# Patient Record
Sex: FEMALE | Race: WHITE | NOT HISPANIC OR LATINO | Employment: FULL TIME | URBAN - METROPOLITAN AREA
[De-identification: names, ages, dates, MRNs, and addresses within clinical notes are randomized per-mention and may not be internally consistent; named-entity substitution may affect disease eponyms.]

---

## 2020-11-11 ENCOUNTER — TELEPHONE (OUTPATIENT)
Dept: UROLOGY | Facility: CLINIC | Age: 50
End: 2020-11-11

## 2020-11-11 DIAGNOSIS — R35.0 URINARY FREQUENCY: Primary | ICD-10-CM

## 2020-11-11 NOTE — TELEPHONE ENCOUNTER
----- Message from Nishi Vaughn sent at 11/11/2020 11:00 AM CST -----  Please call pt @ [phone] regarding an order urine specimen, pt states she have bladder infection, pt states she been working on getting insurance approve

## 2020-11-11 NOTE — TELEPHONE ENCOUNTER
Pt call c/o frequency, odor. Thinks she may have UTI. Former pt from [place] but she has [employer] which we do not accept here. She stated it may take her a while to get insurance figured out but in the meantime would like to be treated for UTI. Please advise.

## 2021-05-07 ENCOUNTER — TELEPHONE (OUTPATIENT)
Dept: UROLOGY | Facility: CLINIC | Age: 51
End: 2021-05-07

## 2021-05-28 ENCOUNTER — TELEPHONE (OUTPATIENT)
Dept: UROLOGY | Facility: CLINIC | Age: 51
End: 2021-05-28

## 2021-07-23 ENCOUNTER — OFFICE VISIT (OUTPATIENT)
Dept: UROLOGY | Facility: CLINIC | Age: 51
End: 2021-07-23
Payer: MEDICARE

## 2021-07-23 VITALS — WEIGHT: 170 LBS

## 2021-07-23 DIAGNOSIS — N39.0 FREQUENT UTI: ICD-10-CM

## 2021-07-23 DIAGNOSIS — N20.0 KIDNEY STONES: Primary | ICD-10-CM

## 2021-07-23 PROCEDURE — 99212 OFFICE O/P EST SF 10 MIN: CPT | Mod: PBBFAC,PO | Performed by: UROLOGY

## 2021-07-23 PROCEDURE — 87186 SC STD MICRODIL/AGAR DIL: CPT | Performed by: UROLOGY

## 2021-07-23 PROCEDURE — 99213 OFFICE O/P EST LOW 20 MIN: CPT | Mod: S$PBB,,, | Performed by: UROLOGY

## 2021-07-23 PROCEDURE — 87086 URINE CULTURE/COLONY COUNT: CPT | Performed by: UROLOGY

## 2021-07-23 PROCEDURE — 87088 URINE BACTERIA CULTURE: CPT | Performed by: UROLOGY

## 2021-07-23 PROCEDURE — 99999 PR PBB SHADOW E&M-EST. PATIENT-LVL II: ICD-10-PCS | Mod: PBBFAC,,, | Performed by: UROLOGY

## 2021-07-23 PROCEDURE — 87077 CULTURE AEROBIC IDENTIFY: CPT | Performed by: UROLOGY

## 2021-07-23 PROCEDURE — 99999 PR PBB SHADOW E&M-EST. PATIENT-LVL II: CPT | Mod: PBBFAC,,, | Performed by: UROLOGY

## 2021-07-23 PROCEDURE — 99213 PR OFFICE/OUTPT VISIT, EST, LEVL III, 20-29 MIN: ICD-10-PCS | Mod: S$PBB,,, | Performed by: UROLOGY

## 2021-07-23 RX ORDER — PAROXETINE HYDROCHLORIDE 20 MG/1
TABLET, FILM COATED ORAL
COMMUNITY
Start: 2021-05-18 | End: 2021-12-22

## 2021-07-23 RX ORDER — PROPRANOLOL HYDROCHLORIDE 160 MG/1
1 CAPSULE, EXTENDED RELEASE ORAL NIGHTLY
COMMUNITY
Start: 2021-05-03

## 2021-07-23 RX ORDER — AMITRIPTYLINE HYDROCHLORIDE 100 MG/1
1 TABLET ORAL NIGHTLY
COMMUNITY
Start: 2021-06-21

## 2021-07-26 ENCOUNTER — HOSPITAL ENCOUNTER (OUTPATIENT)
Dept: RADIOLOGY | Facility: HOSPITAL | Age: 51
Discharge: HOME | End: 2021-07-26
Attending: UROLOGY
Payer: MEDICARE

## 2021-07-26 DIAGNOSIS — N20.0 KIDNEY STONES: ICD-10-CM

## 2021-07-26 PROCEDURE — 74018 RADEX ABDOMEN 1 VIEW: CPT | Mod: 26,,, | Performed by: RADIOLOGY

## 2021-07-26 PROCEDURE — 74018 XR ABDOMEN AP 1 VIEW: ICD-10-PCS | Mod: 26,,, | Performed by: RADIOLOGY

## 2021-07-26 PROCEDURE — 74018 RADEX ABDOMEN 1 VIEW: CPT | Mod: TC

## 2021-07-27 ENCOUNTER — TELEPHONE (OUTPATIENT)
Dept: UROLOGY | Facility: CLINIC | Age: 51
End: 2021-07-27

## 2021-07-27 LAB — BACTERIA UR CULT: ABNORMAL

## 2021-07-27 RX ORDER — NITROFURANTOIN 25; 75 MG/1; MG/1
100 CAPSULE ORAL 2 TIMES DAILY
Qty: 14 CAPSULE | Refills: 0 | Status: SHIPPED | OUTPATIENT
Start: 2021-07-27 | End: 2021-12-22

## 2021-09-09 ENCOUNTER — LAB VISIT (OUTPATIENT)
Dept: LAB | Facility: HOSPITAL | Age: 51
End: 2021-09-09
Attending: UROLOGY
Payer: MEDICARE

## 2021-09-09 ENCOUNTER — TELEPHONE (OUTPATIENT)
Dept: UROLOGY | Facility: CLINIC | Age: 51
End: 2021-09-09

## 2021-09-09 DIAGNOSIS — N39.0 FREQUENT UTI: ICD-10-CM

## 2021-09-09 DIAGNOSIS — N39.0 FREQUENT UTI: Primary | ICD-10-CM

## 2021-09-09 LAB
BILIRUB UR QL STRIP: NEGATIVE
CLARITY UR REFRACT.AUTO: ABNORMAL
COLOR UR AUTO: YELLOW
GLUCOSE UR QL STRIP: NEGATIVE
HGB UR QL STRIP: NEGATIVE
KETONES UR QL STRIP: NEGATIVE
LEUKOCYTE ESTERASE UR QL STRIP: NEGATIVE
NITRITE UR QL STRIP: NEGATIVE
PH UR STRIP: 5 [PH] (ref 5–8)
PROT UR QL STRIP: NEGATIVE
SP GR UR STRIP: 1.01 (ref 1–1.03)
URN SPEC COLLECT METH UR: ABNORMAL

## 2021-09-09 PROCEDURE — 87086 URINE CULTURE/COLONY COUNT: CPT | Performed by: UROLOGY

## 2021-09-09 PROCEDURE — 87088 URINE BACTERIA CULTURE: CPT | Performed by: UROLOGY

## 2021-09-09 PROCEDURE — 87077 CULTURE AEROBIC IDENTIFY: CPT | Performed by: UROLOGY

## 2021-09-09 PROCEDURE — 81003 URINALYSIS AUTO W/O SCOPE: CPT | Performed by: UROLOGY

## 2021-09-09 PROCEDURE — 87186 SC STD MICRODIL/AGAR DIL: CPT | Performed by: UROLOGY

## 2021-09-10 ENCOUNTER — TELEPHONE (OUTPATIENT)
Dept: UROLOGY | Facility: CLINIC | Age: 51
End: 2021-09-10

## 2021-09-11 RX ORDER — AMOXICILLIN 500 MG/1
500 CAPSULE ORAL EVERY 8 HOURS
Qty: 30 CAPSULE | Refills: 0 | Status: SHIPPED | OUTPATIENT
Start: 2021-09-11 | End: 2021-12-22

## 2021-09-12 LAB — BACTERIA UR CULT: ABNORMAL

## 2021-09-13 ENCOUNTER — TELEPHONE (OUTPATIENT)
Dept: UROLOGY | Facility: CLINIC | Age: 51
End: 2021-09-13

## 2021-10-14 ENCOUNTER — TELEPHONE (OUTPATIENT)
Dept: UROLOGY | Facility: CLINIC | Age: 51
End: 2021-10-14

## 2022-10-07 LAB — RESULT: NORMAL

## 2023-02-16 ENCOUNTER — TELEPHONE (OUTPATIENT)
Dept: UROLOGY | Facility: CLINIC | Age: 53
End: 2023-02-16
Payer: MEDICARE

## 2023-02-16 NOTE — TELEPHONE ENCOUNTER
----- Message from Meena Guzman sent at 2/16/2023  3:27 PM CST -----  Contact: glenna  .Type:  Patient Returning Call    Who Called:glenna  Who Left Message for Patient:nurse  Does the patient know what this is regarding?:unknown  Would the patient rather a call back or a response via [facility]? Call back   Best Call Back Number:.[phone]   Additional Information: patient returning call

## 2023-02-16 NOTE — TELEPHONE ENCOUNTER
----- Message from Amanda Steele sent at 2/16/2023  8:24 AM CST -----  Contact: [phone]  Pt is calling stating she have a bladder infection that never cleared . Please call back at [phone] . Thanks/jitendra

## 2023-02-20 ENCOUNTER — TELEPHONE (OUTPATIENT)
Dept: UROLOGY | Facility: CLINIC | Age: 53
End: 2023-02-20
Payer: MEDICARE

## 2023-02-20 NOTE — TELEPHONE ENCOUNTER
Informed patient that Dr. Pepper won't be in for another week. She had an appointment with Kay Leung on 02/20 but she missed the appointment. Patient states that she will call and schedule appointment at later date.

## 2023-02-20 NOTE — TELEPHONE ENCOUNTER
----- Message from Pao Smallslucialine sent at 2/20/2023  8:18 AM CST -----  Contact: pt  Pt is calling in regard to have the nurse to discuss what days the doctor will be available.  Please call her back at [phone] fern/mpd

## 2023-05-01 ENCOUNTER — OFFICE VISIT (OUTPATIENT)
Dept: UROLOGY | Facility: CLINIC | Age: 53
End: 2023-05-01
Payer: MEDICARE

## 2023-05-01 VITALS
BODY MASS INDEX: 26.73 KG/M2 | DIASTOLIC BLOOD PRESSURE: 82 MMHG | SYSTOLIC BLOOD PRESSURE: 130 MMHG | WEIGHT: 170.63 LBS | HEART RATE: 80 BPM

## 2023-05-01 DIAGNOSIS — R31.29 MICROHEMATURIA: ICD-10-CM

## 2023-05-01 DIAGNOSIS — N22 CALCULUS OF URINARY TRACT IN DISEASES CLASSIFIED ELSEWHERE: ICD-10-CM

## 2023-05-01 DIAGNOSIS — N39.0 FREQUENT UTI: Primary | ICD-10-CM

## 2023-05-01 PROCEDURE — 99213 OFFICE O/P EST LOW 20 MIN: CPT | Mod: PBBFAC | Performed by: UROLOGY

## 2023-05-01 PROCEDURE — 99999 PR PBB SHADOW E&M-EST. PATIENT-LVL III: CPT | Mod: PBBFAC,,, | Performed by: UROLOGY

## 2023-05-01 PROCEDURE — 87086 URINE CULTURE/COLONY COUNT: CPT | Performed by: UROLOGY

## 2023-05-01 PROCEDURE — 99999 PR PBB SHADOW E&M-EST. PATIENT-LVL III: ICD-10-PCS | Mod: PBBFAC,,, | Performed by: UROLOGY

## 2023-05-01 PROCEDURE — 99214 PR OFFICE/OUTPT VISIT, EST, LEVL IV, 30-39 MIN: ICD-10-PCS | Mod: S$PBB,,, | Performed by: UROLOGY

## 2023-05-01 PROCEDURE — 99214 OFFICE O/P EST MOD 30 MIN: CPT | Mod: S$PBB,,, | Performed by: UROLOGY

## 2023-05-01 RX ORDER — PHENAZOPYRIDINE HYDROCHLORIDE 200 MG/1
200 TABLET, FILM COATED ORAL 3 TIMES DAILY PRN
Qty: 15 TABLET | Refills: 0 | Status: SHIPPED | OUTPATIENT
Start: 2023-05-01 | End: 2023-05-11

## 2023-05-01 RX ORDER — NITROFURANTOIN 25; 75 MG/1; MG/1
100 CAPSULE ORAL 2 TIMES DAILY
Qty: 14 CAPSULE | Refills: 0 | Status: ON HOLD | OUTPATIENT
Start: 2023-05-01 | End: 2023-06-05 | Stop reason: HOSPADM

## 2023-05-01 RX ORDER — MELOXICAM 15 MG/1
15 TABLET ORAL DAILY
COMMUNITY

## 2023-05-01 NOTE — PROGRESS NOTES
CC: Check up bladder and kidney stones    History of Present Illness:   Amanda Vega is a 52 y.o. female here for follow up    5/1/23-She has been treated for 3 UTIs over the past 2 months. Main symptoms were pain in her body and she just thought it was her fibromyalgia. No gross hematuria. No dysuria. She does have somewhat of a sharp pain in her vagina. She was treated with amoxicillin 3 times. No urinary frequency, but she does have some vaginal itching and foul odor. Slight sharp flank pain on the right, but it went away. No recent stone passage.   7/23/21-She states that she had a UTI recently. Was placed on Macrobid. No dysuria currently. No recent kidney stone passage. Drinking more water. No gross hematuria. +frequency, but not bothersome.     Father recently passed from covid.       Review of Systems   Constitutional:  Negative for chills and fever.   Respiratory:  Negative for shortness of breath.    Cardiovascular:  Negative for chest pain.   Gastrointestinal:  Negative for abdominal pain.   Genitourinary:  Negative for hematuria.   Musculoskeletal:  Positive for arthralgias.   Psychiatric/Behavioral:  Negative for confusion.    All other systems reviewed and are negative.    Current Outpatient Medications   Medication Sig Dispense Refill    amitriptyline (ELAVIL) 100 MG tablet Take 1 tablet by mouth every evening.      meloxicam (MOBIC) 15 MG tablet Take 15 mg by mouth once daily.      propranoloL (INDERAL LA) 160 mg 24 hr capsule Take 1 capsule by mouth every evening.      vortioxetine (TRINTELLIX) 10 mg Tab Take 10 mg by mouth.      nitrofurantoin, macrocrystal-monohydrate, (MACROBID) 100 MG capsule Take 1 capsule (100 mg total) by mouth 2 (two) times daily. 14 capsule 0    phenazopyridine (PYRIDIUM) 200 MG tablet Take 1 tablet (200 mg total) by mouth 3 (three) times daily as needed for Pain. 15 tablet 0    valACYclovir (VALTREX) 500 MG tablet Take 1 tablet (500 mg total) by mouth 2 (two) times  daily. for 3 days 6 tablet 2     No current facility-administered medications for this visit.       Review of patient's allergies indicates:  No Known Allergies    Past medical, family, and social history reviewed as documented in chart with pertinent positive medical, family, and social history detailed in HPI.    Physical Exam  Vitals:    05/01/23 1011   BP: 130/82   Pulse: 80       General: Well-developed, Well Nourished in No acute distress  HEENT: Normocephalic, Atraumatic, Extraocular Movements intact  Neck: Supple, trachea midline, no cervical or supraclavicular lymphadenopathy  Respirations: Even and unlabored  Back: Midline spine without tenderness, no CVA tenderness  Abdomen: Soft, Non-tender, non-distended, no hepatosplenomegaly, no rebound or guarding, no palpable masses  Extremities: Moves all equally, atraumatic, no clubbing, cyanosis or edema  Skin: warm and dry  Psych: normal affect  Neuro: Alert and oriented x 3, Cranial nerves II-XII grossly intact      Urinalysis  Moderate blood, small LE, 100 protein    Assesment:   1. Frequent UTI  CULTURE, URINE      2. Calculus of urinary tract in diseases classified elsewhere  CT Renal Stone Study ABD Pelvis WO      3. Microhematuria                Plan:  Frequent UTI  -     CULTURE, URINE    Calculus of urinary tract in diseases classified elsewhere  -     CT Renal Stone Study ABD Pelvis WO; Future; Expected date: 05/01/2023    Microhematuria    Other orders  -     nitrofurantoin, macrocrystal-monohydrate, (MACROBID) 100 MG capsule; Take 1 capsule (100 mg total) by mouth 2 (two) times daily.  Dispense: 14 capsule; Refill: 0  -     phenazopyridine (PYRIDIUM) 200 MG tablet; Take 1 tablet (200 mg total) by mouth 3 (three) times daily as needed for Pain.  Dispense: 15 tablet; Refill: 0

## 2023-05-02 LAB
BACTERIA UR CULT: NORMAL
BACTERIA UR CULT: NORMAL

## 2023-05-05 ENCOUNTER — TELEPHONE (OUTPATIENT)
Dept: UROLOGY | Facility: CLINIC | Age: 53
End: 2023-05-05
Payer: MEDICARE

## 2023-05-05 NOTE — TELEPHONE ENCOUNTER
Called and spoke with patient, informed her that her culture results are not back in yet everything is resolved.  ----- Message from Evie Wolff sent at 5/4/2023 11:26 AM CDT -----  Contact: self [phone]  Patient called in this morning stating she had left a urine sample on Monday and no one has called her back with the result. Please call back [phone]

## 2023-05-15 ENCOUNTER — HOSPITAL ENCOUNTER (OUTPATIENT)
Dept: RADIOLOGY | Facility: HOSPITAL | Age: 53
Discharge: HOME | End: 2023-05-15
Attending: UROLOGY
Payer: MEDICARE

## 2023-05-15 DIAGNOSIS — N22 CALCULUS OF URINARY TRACT IN DISEASES CLASSIFIED ELSEWHERE: ICD-10-CM

## 2023-05-15 PROCEDURE — 74176 CT RENAL STONE STUDY ABD PELVIS WO: ICD-10-PCS | Mod: 26,,, | Performed by: RADIOLOGY

## 2023-05-15 PROCEDURE — 74176 CT ABD & PELVIS W/O CONTRAST: CPT | Mod: 26,,, | Performed by: RADIOLOGY

## 2023-05-15 PROCEDURE — 74176 CT ABD & PELVIS W/O CONTRAST: CPT | Mod: TC

## 2023-05-16 ENCOUNTER — TELEPHONE (OUTPATIENT)
Dept: UROLOGY | Facility: CLINIC | Age: 53
End: 2023-05-16
Payer: MEDICARE

## 2023-05-16 NOTE — TELEPHONE ENCOUNTER
Called pt concerning labs. No answer [facility] 1:42    ----- Message from Marian Pepper MD sent at 5/16/2023  1:19 PM CDT -----  Please notify patient that she has a large kidney stone that is in a location to soon be obstructing. Please schedule her for a follow up with me within the next week or 2.

## 2023-05-26 ENCOUNTER — TELEPHONE (OUTPATIENT)
Dept: UROLOGY | Facility: CLINIC | Age: 53
End: 2023-05-26
Payer: MEDICARE

## 2023-05-26 NOTE — TELEPHONE ENCOUNTER
Spoke with patient and will get her scheduled for a 2 week f/u with dr tatum.  ----- Message from Renetta Álvarez sent at 5/26/2023  1:39 PM CDT -----  Contact: Amanda Crossci needs a call back at [phone], Regards to her right side pain she thinks it may related to a kidney stones.    Thanks  Td

## 2023-05-29 ENCOUNTER — HOSPITAL ENCOUNTER (OUTPATIENT)
Dept: CARDIOLOGY | Facility: HOSPITAL | Age: 53
Discharge: HOME | End: 2023-05-29
Attending: UROLOGY
Payer: MEDICARE

## 2023-05-29 ENCOUNTER — OFFICE VISIT (OUTPATIENT)
Dept: UROLOGY | Facility: CLINIC | Age: 53
End: 2023-05-29
Payer: MEDICARE

## 2023-05-29 VITALS
WEIGHT: 168.63 LBS | SYSTOLIC BLOOD PRESSURE: 113 MMHG | HEIGHT: 67 IN | BODY MASS INDEX: 26.47 KG/M2 | HEART RATE: 80 BPM | DIASTOLIC BLOOD PRESSURE: 75 MMHG

## 2023-05-29 DIAGNOSIS — Z01.818 PRE-OP TESTING: ICD-10-CM

## 2023-05-29 DIAGNOSIS — N20.0 LEFT RENAL STONE: Primary | ICD-10-CM

## 2023-05-29 DIAGNOSIS — N39.0 RECURRENT UTI: ICD-10-CM

## 2023-05-29 PROCEDURE — 99214 OFFICE O/P EST MOD 30 MIN: CPT | Mod: S$PBB,,, | Performed by: UROLOGY

## 2023-05-29 PROCEDURE — 99214 OFFICE O/P EST MOD 30 MIN: CPT | Mod: PBBFAC | Performed by: UROLOGY

## 2023-05-29 PROCEDURE — 93010 EKG 12-LEAD: ICD-10-PCS | Mod: ,,, | Performed by: INTERNAL MEDICINE

## 2023-05-29 PROCEDURE — 99214 PR OFFICE/OUTPT VISIT, EST, LEVL IV, 30-39 MIN: ICD-10-PCS | Mod: S$PBB,,, | Performed by: UROLOGY

## 2023-05-29 PROCEDURE — 87077 CULTURE AEROBIC IDENTIFY: CPT | Performed by: UROLOGY

## 2023-05-29 PROCEDURE — 87186 SC STD MICRODIL/AGAR DIL: CPT | Performed by: UROLOGY

## 2023-05-29 PROCEDURE — 87086 URINE CULTURE/COLONY COUNT: CPT | Performed by: UROLOGY

## 2023-05-29 PROCEDURE — 93010 ELECTROCARDIOGRAM REPORT: CPT | Mod: ,,, | Performed by: INTERNAL MEDICINE

## 2023-05-29 PROCEDURE — 87088 URINE BACTERIA CULTURE: CPT | Performed by: UROLOGY

## 2023-05-29 PROCEDURE — 99999 PR PBB SHADOW E&M-EST. PATIENT-LVL IV: ICD-10-PCS | Mod: PBBFAC,,, | Performed by: UROLOGY

## 2023-05-29 PROCEDURE — 99999 PR PBB SHADOW E&M-EST. PATIENT-LVL IV: CPT | Mod: PBBFAC,,, | Performed by: UROLOGY

## 2023-05-29 PROCEDURE — 93005 ELECTROCARDIOGRAM TRACING: CPT

## 2023-05-29 RX ORDER — CIPROFLOXACIN 2 MG/ML
400 INJECTION, SOLUTION INTRAVENOUS
Status: CANCELLED | OUTPATIENT
Start: 2023-05-29

## 2023-05-29 RX ORDER — HYDROCODONE BITARTRATE AND ACETAMINOPHEN 10; 325 MG/1; MG/1
1 TABLET ORAL EVERY 6 HOURS PRN
Qty: 20 TABLET | Refills: 0 | Status: ON HOLD | OUTPATIENT
Start: 2023-05-29 | End: 2023-06-05 | Stop reason: SDUPTHER

## 2023-05-29 NOTE — PROGRESS NOTES
CC: Check up bladder and kidney stones    History of Present Illness:   Amanda Vega is a 52 y.o. female here for follow up    5/29/23- Pt reports that she has been having some LLQ pain and sharp pain in her bottom. She also reports some right flank pain. No fever. CT scan was personally reviewed, showing a 10mm L renal pelvis stone and bilateral non-obstructing renal stones. HU measure <1000.   5/1/23-She has been treated for 3 UTIs over the past 2 months. Main symptoms were pain in her body and she just thought it was her fibromyalgia. No gross hematuria. No dysuria. She does have somewhat of a sharp pain in her vagina. She was treated with amoxicillin 3 times. No urinary frequency, but she does have some vaginal itching and foul odor. Slight sharp flank pain on the right, but it went away. No recent stone passage.   7/23/21-She states that she had a UTI recently. Was placed on Macrobid. No dysuria currently. No recent kidney stone passage. Drinking more water. No gross hematuria. +frequency, but not bothersome.   Father recently passed from covid.       Review of Systems   Constitutional:  Negative for chills and fever.   Respiratory:  Negative for shortness of breath.    Cardiovascular:  Negative for chest pain.   Gastrointestinal:  Negative for abdominal pain.   Genitourinary:  Negative for hematuria.   Musculoskeletal:  Positive for arthralgias.   Psychiatric/Behavioral:  Negative for confusion.    All other systems reviewed and are negative.    Current Outpatient Medications   Medication Sig Dispense Refill    amitriptyline (ELAVIL) 100 MG tablet Take 1 tablet by mouth every evening.      meloxicam (MOBIC) 15 MG tablet Take 15 mg by mouth once daily.      nitrofurantoin, macrocrystal-monohydrate, (MACROBID) 100 MG capsule Take 1 capsule (100 mg total) by mouth 2 (two) times daily. 14 capsule 0    propranoloL (INDERAL LA) 160 mg 24 hr capsule Take 1 capsule by mouth every evening.      vortioxetine  (TRINTELLIX) 10 mg Tab Take 10 mg by mouth.      HYDROcodone-acetaminophen (NORCO)  mg per tablet Take 1 tablet by mouth every 6 (six) hours as needed for Pain. 20 tablet 0    valACYclovir (VALTREX) 500 MG tablet Take 1 tablet (500 mg total) by mouth 2 (two) times daily. for 3 days 6 tablet 2     No current facility-administered medications for this visit.       Review of patient's allergies indicates:  No Known Allergies    Past medical, family, and social history reviewed as documented in chart with pertinent positive medical, family, and social history detailed in HPI.    Physical Exam  Vitals:    05/29/23 0849   BP: 113/75   Pulse: 80       General: Well-developed, Well Nourished in No acute distress  HEENT: Normocephalic, Atraumatic, Extraocular Movements intact  Neck: Supple, trachea midline, no cervical or supraclavicular lymphadenopathy  Respirations: Even and unlabored  Back: Midline spine without tenderness, no CVA tenderness  Abdomen: Soft, LLQ tenderness, non-distended, no hepatosplenomegaly, no rebound or guarding, no palpable masses  Extremities: Moves all equally, atraumatic, no clubbing, cyanosis or edema  Skin: warm and dry  Psych: normal affect  Neuro: Alert and oriented x 3, Cranial nerves II-XII grossly intact      Assesment:   1. Left renal stone  Place in Outpatient    Case Request Operating Room: LITHOTRIPSY, ESWL    Vital Signs     Diet NPO    Place sequential compression device    HYDROcodone-acetaminophen (NORCO)  mg per tablet      2. Recurrent UTI  Urine culture              Plan:  Left renal stone  -     Place in Outpatient; Standing  -     Case Request Operating Room: LITHOTRIPSY, ESWL  -     Vital Signs ; Standing  -     Diet NPO; Standing  -     Place sequential compression device; Standing  -     HYDROcodone-acetaminophen (NORCO)  mg per tablet; Take 1 tablet by mouth every 6 (six) hours as needed for Pain.  Dispense: 20 tablet; Refill: 0    Recurrent UTI  -      Urine culture    Other orders  -     IP VTE LOW RISK PATIENT; Standing  -     ciprofloxacin (CIPRO)400mg/200ml D5W IVPB 400 mg      Discussed ESWL vs USE with stent. Pt would like to avoid stenting and understands the possibility of large fragments following ESWL. Will schedule ESWL for 6/5/23.

## 2023-05-30 RX ORDER — AMOXICILLIN AND CLAVULANATE POTASSIUM 875; 125 MG/1; MG/1
1 TABLET, FILM COATED ORAL EVERY 12 HOURS
Qty: 20 TABLET | Refills: 0 | Status: SHIPPED | OUTPATIENT
Start: 2023-05-30 | End: 2023-08-10

## 2023-05-31 ENCOUNTER — TELEPHONE (OUTPATIENT)
Dept: PREADMISSION TESTING | Facility: HOSPITAL | Age: 53
End: 2023-05-31
Payer: MEDICARE

## 2023-05-31 LAB — BACTERIA UR CULT: ABNORMAL

## 2023-05-31 NOTE — TELEPHONE ENCOUNTER
Pre op instructions reviewed with Pt,verbalized understanding.    To confirm, Surgery is scheduled on 6/05/23. We will call you late afternoon the business day prior to surgery with your arrival time.    *Please report to the [facility]obby (1st Floor) located off of [facility] (2nd Entrance/Building on the left, in front of the flag pole).  Address: [address] [place] [address] [place] [address]        INSTRUCTIONS IMPORTANT!!!  Do Not Eat, Drink, or Smoke after 12 midnight unless instructed otherwise by your Surgeon. OK to brush teeth, no gum, candy or mints!      *Take Only these medications with a small sip of water Morning of Surgery:  None      ____  HOLD all vitamins, herbal supplements, aspirin products & NSAIDS 7 days prior to surgery, as these can thin the blood.  ____  Avoid Alcoholic beverages 3 days prior to surgery, as it can thin the blood.  ____  NO Acrylic/fake nails or nail polish worn day of surgery (specifically hand/arm & foot surgeries).  ____  NO powder, lotions, deodorants, oils or cream on body.  ____  Remove all jewelry & piercings prior to surgery.  ____  Remove Dentures, Hearing Aids & Contact Lens prior to surgery.  ____  Bring photo ID and insurance information to hospital (Leave Valuables at Home).  ____  If going home the same day, arrange for a ride home. You will not be able to drive for 24 hrs if Anesthesia was used.   ____  Females (ages 11-60): may need to give a urine sample the morning of surgery; please see Pre op Nurse prior to using the restroom.  ____  Males: Stop ED medications (Viagra, Cialis) 24 hrs prior to surgery.  ____  Wear clean, loose fitting clothing to allow for dressings/ bandages.            Diabetic Patients: If you take diabetic medication, do NOT take morning of surgery unless instructed by Doctor. Metformin to be stopped 24 hrs prior to surgery time. DO NOT take long-acting insulin the evening before surgery. Blood sugars will be checked in  pre-op by Nurse.    Bathing Instructions:    -Shower with anti-bacterial Soap (Hibiclens or Dial) the night before surgery and the morning of.   -Do not use Hibiclens on your face or genitals.   -Apply clean clothes after shower.  -Do not shave your face or body 2 days prior to surgery unless instructed otherwise by your Surgeon.  -Do not shave pubic hair 7 days prior to surgery (gyn pt's).    [facility] Visitor/Ride Policy:  Only 2 adults allowed (over the age of 18) to accompany you to the Hospital. You Must have a ride home from a responsible adult that you know and trust. Medical Transport, Uber or Lyft can only be used if patient has a responsible adult to accompany them during ride home.    Discharge Instructions: You will receive Post-op/Discharge instructions by your Discharge Nurse prior to going home. Please call your Surgeon's office with any post-surgery questions/concerns @ [phone].    *If you are running late or have questions the morning of surgery, please call the Surgery Dept @ [phone]  *Insurance/ Financial Questions, please call [phone]  *If you need to Cancel/Reschedule Surgery, please call Surgeon office @ [phone].      Thank you,  -[facility] Pre Admit Dept.  [phone] or [phone]  M-F 7:30 am-4:30 pm (Closed Major Holidays)

## 2023-06-02 ENCOUNTER — TELEPHONE (OUTPATIENT)
Dept: UROLOGY | Facility: CLINIC | Age: 53
End: 2023-06-02
Payer: MEDICARE

## 2023-06-02 ENCOUNTER — TELEPHONE (OUTPATIENT)
Dept: PREADMISSION TESTING | Facility: HOSPITAL | Age: 53
End: 2023-06-02
Payer: MEDICARE

## 2023-06-02 NOTE — TELEPHONE ENCOUNTER
----- Message from Rosie Garcia RN sent at 6/2/2023  2:35 PM CDT -----  Regarding: Surgery Arrival Time  Hello, I was not able to reach the patient or her mother/transportation directly with surgery arrival time of 12:00 noon. I left each a voice mail and sent a My Chart Message. Thanks

## 2023-06-05 ENCOUNTER — ANESTHESIA EVENT (OUTPATIENT)
Dept: SURGERY | Facility: HOSPITAL | Age: 53
End: 2023-06-05
Payer: MEDICARE

## 2023-06-05 ENCOUNTER — ANESTHESIA (OUTPATIENT)
Dept: SURGERY | Facility: HOSPITAL | Age: 53
End: 2023-06-05
Payer: MEDICARE

## 2023-06-05 ENCOUNTER — HOSPITAL ENCOUNTER (OUTPATIENT)
Facility: HOSPITAL | Age: 53
Discharge: HOME | End: 2023-06-05
Attending: UROLOGY | Admitting: UROLOGY
Payer: MEDICARE

## 2023-06-05 DIAGNOSIS — N20.0 LEFT RENAL STONE: Primary | ICD-10-CM

## 2023-06-05 PROCEDURE — 37000009 HC ANESTHESIA EA ADD 15 MINS: Performed by: UROLOGY

## 2023-06-05 PROCEDURE — 63600175 PHARM REV CODE 636 W HCPCS: Performed by: ANESTHESIOLOGY

## 2023-06-05 PROCEDURE — 37000008 HC ANESTHESIA 1ST 15 MINUTES: Performed by: UROLOGY

## 2023-06-05 PROCEDURE — 36000705 HC OR TIME LEV I EA ADD 15 MIN: Performed by: UROLOGY

## 2023-06-05 PROCEDURE — 71000015 HC POSTOP RECOV 1ST HR: Performed by: UROLOGY

## 2023-06-05 PROCEDURE — 25000003 PHARM REV CODE 250: Performed by: NURSE ANESTHETIST, CERTIFIED REGISTERED

## 2023-06-05 PROCEDURE — 63600175 PHARM REV CODE 636 W HCPCS: Performed by: UROLOGY

## 2023-06-05 PROCEDURE — 71000033 HC RECOVERY, INTIAL HOUR: Performed by: UROLOGY

## 2023-06-05 PROCEDURE — 63600175 PHARM REV CODE 636 W HCPCS: Performed by: NURSE ANESTHETIST, CERTIFIED REGISTERED

## 2023-06-05 PROCEDURE — 50590 FRAGMENTING OF KIDNEY STONE: CPT | Mod: LT,,, | Performed by: UROLOGY

## 2023-06-05 PROCEDURE — 36000704 HC OR TIME LEV I 1ST 15 MIN: Performed by: UROLOGY

## 2023-06-05 PROCEDURE — 50590 PR FRAGMENT KIDNEY STONE/ ESWL: ICD-10-PCS | Mod: LT,,, | Performed by: UROLOGY

## 2023-06-05 RX ORDER — CIPROFLOXACIN 2 MG/ML
400 INJECTION, SOLUTION INTRAVENOUS
Status: COMPLETED | OUTPATIENT
Start: 2023-06-05 | End: 2023-06-05

## 2023-06-05 RX ORDER — TAMSULOSIN HYDROCHLORIDE 0.4 MG/1
0.4 CAPSULE ORAL DAILY
Qty: 30 CAPSULE | Refills: 0 | Status: SHIPPED | OUTPATIENT
Start: 2023-06-05 | End: 2023-08-10

## 2023-06-05 RX ORDER — ONDANSETRON 2 MG/ML
INJECTION INTRAMUSCULAR; INTRAVENOUS
Status: DISCONTINUED | OUTPATIENT
Start: 2023-06-05 | End: 2023-06-05

## 2023-06-05 RX ORDER — SODIUM CHLORIDE 0.9 % (FLUSH) 0.9 %
3 SYRINGE (ML) INJECTION
Status: DISCONTINUED | OUTPATIENT
Start: 2023-06-05 | End: 2023-06-05 | Stop reason: HOSPADM

## 2023-06-05 RX ORDER — HYDROMORPHONE HYDROCHLORIDE 2 MG/ML
0.2 INJECTION, SOLUTION INTRAMUSCULAR; INTRAVENOUS; SUBCUTANEOUS EVERY 5 MIN PRN
Status: DISCONTINUED | OUTPATIENT
Start: 2023-06-05 | End: 2023-06-05 | Stop reason: HOSPADM

## 2023-06-05 RX ORDER — ONDANSETRON 4 MG/1
4 TABLET, ORALLY DISINTEGRATING ORAL EVERY 8 HOURS PRN
Qty: 20 TABLET | Refills: 0 | Status: SHIPPED | OUTPATIENT
Start: 2023-06-05

## 2023-06-05 RX ORDER — OXYCODONE HYDROCHLORIDE 5 MG/1
5 TABLET ORAL
Status: DISCONTINUED | OUTPATIENT
Start: 2023-06-05 | End: 2023-06-05 | Stop reason: HOSPADM

## 2023-06-05 RX ORDER — MEPERIDINE HYDROCHLORIDE 25 MG/ML
12.5 INJECTION INTRAMUSCULAR; INTRAVENOUS; SUBCUTANEOUS ONCE
Status: DISCONTINUED | OUTPATIENT
Start: 2023-06-05 | End: 2023-06-05 | Stop reason: HOSPADM

## 2023-06-05 RX ORDER — PROPOFOL 10 MG/ML
VIAL (ML) INTRAVENOUS
Status: DISCONTINUED | OUTPATIENT
Start: 2023-06-05 | End: 2023-06-05

## 2023-06-05 RX ORDER — PROCHLORPERAZINE EDISYLATE 5 MG/ML
5 INJECTION INTRAMUSCULAR; INTRAVENOUS EVERY 30 MIN PRN
Status: DISCONTINUED | OUTPATIENT
Start: 2023-06-05 | End: 2023-06-05 | Stop reason: HOSPADM

## 2023-06-05 RX ORDER — MIDAZOLAM HYDROCHLORIDE 1 MG/ML
INJECTION, SOLUTION INTRAMUSCULAR; INTRAVENOUS
Status: DISCONTINUED | OUTPATIENT
Start: 2023-06-05 | End: 2023-06-05

## 2023-06-05 RX ORDER — FENTANYL CITRATE 50 UG/ML
INJECTION, SOLUTION INTRAMUSCULAR; INTRAVENOUS
Status: DISCONTINUED | OUTPATIENT
Start: 2023-06-05 | End: 2023-06-05

## 2023-06-05 RX ORDER — HYDROCODONE BITARTRATE AND ACETAMINOPHEN 10; 325 MG/1; MG/1
1 TABLET ORAL EVERY 6 HOURS PRN
Qty: 20 TABLET | Refills: 0 | Status: SHIPPED | OUTPATIENT
Start: 2023-06-05 | End: 2023-08-10

## 2023-06-05 RX ORDER — ALBUTEROL SULFATE 0.83 MG/ML
2.5 SOLUTION RESPIRATORY (INHALATION) EVERY 4 HOURS PRN
Status: DISCONTINUED | OUTPATIENT
Start: 2023-06-05 | End: 2023-06-05 | Stop reason: HOSPADM

## 2023-06-05 RX ORDER — DIPHENHYDRAMINE HYDROCHLORIDE 50 MG/ML
25 INJECTION INTRAMUSCULAR; INTRAVENOUS EVERY 6 HOURS PRN
Status: DISCONTINUED | OUTPATIENT
Start: 2023-06-05 | End: 2023-06-05 | Stop reason: HOSPADM

## 2023-06-05 RX ORDER — ONDANSETRON 2 MG/ML
4 INJECTION INTRAMUSCULAR; INTRAVENOUS DAILY PRN
Status: DISCONTINUED | OUTPATIENT
Start: 2023-06-05 | End: 2023-06-05 | Stop reason: HOSPADM

## 2023-06-05 RX ORDER — LIDOCAINE HYDROCHLORIDE 20 MG/ML
INJECTION INTRAVENOUS
Status: DISCONTINUED | OUTPATIENT
Start: 2023-06-05 | End: 2023-06-05

## 2023-06-05 RX ORDER — KETOROLAC TROMETHAMINE 30 MG/ML
15 INJECTION, SOLUTION INTRAMUSCULAR; INTRAVENOUS EVERY 8 HOURS PRN
Status: DISCONTINUED | OUTPATIENT
Start: 2023-06-05 | End: 2023-06-05 | Stop reason: HOSPADM

## 2023-06-05 RX ADMIN — CIPROFLOXACIN 400 MG: 2 INJECTION, SOLUTION INTRAVENOUS at 01:06

## 2023-06-05 RX ADMIN — HYDROMORPHONE HYDROCHLORIDE 0.2 MG: 2 INJECTION INTRAMUSCULAR; INTRAVENOUS; SUBCUTANEOUS at 02:06

## 2023-06-05 RX ADMIN — PROPOFOL 130 MG: 10 INJECTION, EMULSION INTRAVENOUS at 01:06

## 2023-06-05 RX ADMIN — SODIUM CHLORIDE, SODIUM LACTATE, POTASSIUM CHLORIDE, AND CALCIUM CHLORIDE: .6; .31; .03; .02 INJECTION, SOLUTION INTRAVENOUS at 01:06

## 2023-06-05 RX ADMIN — LIDOCAINE HYDROCHLORIDE 100 MG: 20 INJECTION INTRAVENOUS at 01:06

## 2023-06-05 RX ADMIN — MIDAZOLAM 2 MG: 1 INJECTION INTRAMUSCULAR; INTRAVENOUS at 01:06

## 2023-06-05 RX ADMIN — HYDROMORPHONE HYDROCHLORIDE 0.2 MG: 2 INJECTION INTRAMUSCULAR; INTRAVENOUS; SUBCUTANEOUS at 03:06

## 2023-06-05 RX ADMIN — KETOROLAC TROMETHAMINE 15 MG: 30 INJECTION, SOLUTION INTRAMUSCULAR; INTRAVENOUS at 02:06

## 2023-06-05 RX ADMIN — FENTANYL CITRATE 100 MCG: 50 INJECTION, SOLUTION INTRAMUSCULAR; INTRAVENOUS at 01:06

## 2023-06-05 RX ADMIN — ONDANSETRON 4 MG: 2 INJECTION INTRAMUSCULAR; INTRAVENOUS at 02:06

## 2023-06-05 NOTE — PLAN OF CARE
Patient could not wait any longer for the post-op medications to arrive from the pharmacy. She left without her medications. She said she would come back tomorrow and get them.

## 2023-06-05 NOTE — INTERVAL H&P NOTE
The patient has been examined and the H&P has been reviewed:    I concur with the findings and changes have been noted since the H&P was written: Pt treated with Augmentin for positive urine culture. Ready for L ESWL.     Surgery risks, benefits and alternative options discussed and understood by patient/family.          There are no hospital problems to display for this patient.

## 2023-06-05 NOTE — DISCHARGE SUMMARY
[facility] - Surgery (Hospital)  Discharge Note  Short Stay    Procedure(s) (LRB):  LITHOTRIPSY, ESWL (Left)      OUTCOME: Patient tolerated treatment/procedure well without complication and is now ready for discharge.    DISPOSITION: Home or Self Care    FINAL DIAGNOSIS:  Left renal stone    FOLLOWUP: In clinic    DISCHARGE INSTRUCTIONS:    Discharge Procedure Orders   X-Ray Abdomen AP 1 View   Standing Status: Future Standing Exp. Date: 06/05/24     Order Specific Question Answer Comments   May the Radiologist modify the order per protocol to meet the clinical needs of the patient? Yes    Release to patient Immediate      Diet Adult Regular     Notify your health care provider if you experience any of the following:  temperature >100.4     Notify your health care provider if you experience any of the following:  persistent nausea and vomiting or diarrhea     Notify your health care provider if you experience any of the following:  severe uncontrolled pain     No dressing needed     Activity as tolerated        TIME SPENT ON DISCHARGE: 10 minutes

## 2023-06-05 NOTE — TRANSFER OF CARE
"Anesthesia Transfer of Care Note    Patient: Amanda Vega    Procedure(s) Performed: Procedure(s) (LRB):  LITHOTRIPSY, ESWL (Left)    Patient location: PACU    Anesthesia Type: general    Transport from OR: Transported from OR on room air with adequate spontaneous ventilation    Post pain: adequate analgesia    Post assessment: no apparent anesthetic complications    Post vital signs: stable    Level of consciousness: awake    Nausea/Vomiting: no nausea/vomiting    Complications: none    Transfer of care protocol was followed      Last vitals:   Visit Vitals  /76   Pulse 74   Temp 36.6 °C (97.9 °F) (Temporal)   Resp 18   Ht 5' 7" (1.702 m)   Wt 75.7 kg (166 lb 14.2 oz)   LMP  (LMP Unknown)   SpO2 97%   Breastfeeding No   BMI 26.14 kg/m²     "

## 2023-06-05 NOTE — PLAN OF CARE
POC and discharge instructions discussed with patient and family/friend; both verbalizes understanding

## 2023-06-05 NOTE — ANESTHESIA POSTPROCEDURE EVALUATION
Anesthesia Post Evaluation    Patient: Amanda Vega    Procedure(s) Performed: Procedure(s) (LRB):  LITHOTRIPSY, ESWL (Left)    Final Anesthesia Type: general      Patient location during evaluation: PACU  Patient participation: Yes- Able to Participate  Level of consciousness: awake  Post-procedure vital signs: reviewed and stable  Pain management: adequate  Airway patency: patent    PONV status at discharge: No PONV  Anesthetic complications: no      Cardiovascular status: hemodynamically stable  Respiratory status: unassisted  Hydration status: euvolemic  Follow-up not needed.          Vitals Value Taken Time   /74 06/05/23 1500   Temp 36.7 °C (98 °F) 06/05/23 1450   Pulse 82 06/05/23 1502   Resp 28 06/05/23 1501   SpO2 99 % 06/05/23 1502   Vitals shown include unvalidated device data.      Event Time   Out of Recovery 15:05:16         Pain/Greg Score: Pain Rating Prior to Med Admin: 6 (6/5/2023  3:03 PM)  Greg Score: 10 (6/5/2023  3:45 PM)

## 2023-06-05 NOTE — ANESTHESIA PROCEDURE NOTES
Intubation    Date/Time: 6/5/2023 1:42 PM  Performed by: Joshua Lopez CRNA  Authorized by: Trevor Sanderson MD     Intubation:     Induction:  Intravenous    Intubated:  Postinduction    Mask Ventilation:  Easy mask    Attempts:  1    Attempted By:  CRNA    Method of Intubation:  Direct    Difficult Airway Encountered?: No      Complications:  None    Airway Device:  Supraglottic airway/LMA    Airway Device Size:  3.0    Inflation Amount (mL):  8    Secured at:  The lips    Placement Verified By:  Capnometry    Complicating Factors:  None    Findings Post-Intubation:  BS equal bilateral

## 2023-06-05 NOTE — OP NOTE
Date of Procedure: 06/05/2023    PREOPERATIVE DIAGNOSIS:  Left renal stone.    POSTOPERATIVE DIAGNOSIS:  Same.    PROCEDURES:  Left extracorporeal shock wave lithotripsy.    SURGEON:  Marian Pepper M.D.    Assistants: None    Specimen: None    ANESTHESIA:  General    FINDINGS:  good  fragmentation    PROCEDURE IN DETAIL:  After proper consents were obtained, the patient brought to the Operating Room where the patient was placed under general anesthesia.  They were then appropriately positioned on the lithotripsy table.  After alignment of the system in the AP and oblique plance, the aforementioned stone treatment was performed consisting of 3000 shocks at a max power of 7kV, and a rate of 120.  Stone fragmentation appeared good; however, there may always be a need for further management if it does not pass.  The patient tolerated this well and there were no issues.  They were transferred to the PACU in stable condition.  I will have the patient return in 2 weeks with a KUB before the visit.    BLOOD LOSS:  None.    COMPLICATIONS:  None.

## 2023-06-05 NOTE — ANESTHESIA PREPROCEDURE EVALUATION
"                                                                                                             06/05/2023  Amanda Vega is a 52 y.o., female.    Patient Active Problem List   Diagnosis    Left renal stone    Recurrent UTI     Pre-op Assessment    I have reviewed the Patient Summary Reports.     I have reviewed the Nursing Notes. I have reviewed the NPO Status.   I have reviewed the Medications.     Review of Systems  Anesthesia Hx:  PATIENT REPORT SHE WAS "OUT FOR TWO OR THREE DAYS" AFTER HER LAST LITHOTRIPSY Denies Family Hx of Anesthesia complications.  Personal Hx of Anesthesia complications Slow To Awaken/Delayed Emergence   Social:  Non-Smoker    Hematology/Oncology:  Hematology Normal   Oncology Normal     EENT/Dental:EENT/Dental Normal   Cardiovascular:   Hypertension ECG has been reviewed.    Pulmonary:  Pulmonary Normal    Renal/:   renal calculi    Hepatic/GI:  Hepatic/GI Normal    Musculoskeletal:  Musculoskeletal Normal Fibromyalgia   Neurological:  Neurology Normal    Endocrine:  Endocrine Normal    Dermatological:  Skin Normal    Psych:  Psychiatric Normal           Physical Exam  General: Alert and Oriented    Airway:  Mallampati: II   Mouth Opening: Normal  TM Distance: Normal  Tongue: Normal  Neck ROM: Normal ROM        Anesthesia Plan  Type of Anesthesia, risks & benefits discussed:    Anesthesia Type: Gen ETT, Gen Supraglottic Airway  Intra-op Monitoring Plan: Standard ASA Monitors  Induction:  IV  Informed Consent: Informed consent signed with the Patient and all parties understand the risks and agree with anesthesia plan.  All questions answered.   ASA Score: 2  Day of Surgery Review of History & Physical: I have interviewed and examined the patient. I have reviewed the patient's H&P dated:   Anesthesia Plan Notes: RECOMMEND SEVOFLURANE, NO VERSED OR SCOPOLAMINE AND MINIMAL FENTANYL    FOR HER NAUSEA RISK RECOMMEND ZOFRAN AND DECADRON    Ready For Surgery From " Anesthesia Perspective.     .

## 2023-06-05 NOTE — ANESTHESIA RELEASE NOTE
"Anesthesia Release from PACU Note    Patient: Amanda Vega    Procedure(s) Performed: Procedure(s) (LRB):  LITHOTRIPSY, ESWL (Left)    Anesthesia type: general    Post pain: Adequate analgesia    Post assessment: no apparent anesthetic complications    Last Vitals:   Visit Vitals  /76   Pulse 74   Temp 36.6 °C (97.9 °F) (Temporal)   Resp 18   Ht 5' 7" (1.702 m)   Wt 75.7 kg (166 lb 14.2 oz)   LMP  (LMP Unknown)   SpO2 97%   Breastfeeding No   BMI 26.14 kg/m²       Post vital signs: stable    Level of consciousness: awake    Nausea/Vomiting: no nausea/no vomiting    Complications: none    Airway Patency: patent    Respiratory: unassisted    Cardiovascular: stable and blood pressure at baseline    Hydration: euvolemic  "

## 2023-06-06 ENCOUNTER — TELEPHONE (OUTPATIENT)
Dept: UROLOGY | Facility: CLINIC | Age: 53
End: 2023-06-06
Payer: MEDICARE

## 2023-06-06 VITALS
DIASTOLIC BLOOD PRESSURE: 74 MMHG | OXYGEN SATURATION: 99 % | BODY MASS INDEX: 26.19 KG/M2 | HEART RATE: 77 BPM | SYSTOLIC BLOOD PRESSURE: 130 MMHG | HEIGHT: 67 IN | TEMPERATURE: 98 F | RESPIRATION RATE: 20 BRPM | WEIGHT: 166.88 LBS

## 2023-06-06 NOTE — TELEPHONE ENCOUNTER
----- Message from Mega Delong, MA sent at 6/6/2023  9:41 AM CDT -----  Contact: Amanda  Pt is requesting you give her a call: [phone]. She is having intense sharp pain in her bottom. States that she has been having this problem since before the sx and does not want to come in for an office visit.    ----- Message -----  From: Lisa García  Sent: 6/6/2023   9:37 AM CDT  To: Evgeny MILLER Staff    Type:  Needs Medical Advice    Who Called: Amanda  Symptoms (please be specific): Intense, sharp pain in bottom   How long has patient had these symptoms:  Approximately 2 weeks   Pharmacy name and phone #:    [id number] [place] - [address] AT [facility]  [address] [place] [address]  [facility] [address]  Phone: [phone] Fax: [phone]  Would the patient rather a call back or a response via [facility]? call  Best Call Back Number:[phone]  Additional Information: Patient reports having lithotripsy yesterday and experiencing intense, sharp pain in bottom. Please give patient an immediate call back as requested.   Thank you,  GH

## 2023-06-06 NOTE — TELEPHONE ENCOUNTER
Patient reports sharp pain in her bottom, asking if it is a stone fragment. Discussed that it certainly could be. She will continue to strain her urine and take flomax and pain medication prn.

## 2023-06-06 NOTE — TELEPHONE ENCOUNTER
----- Message from Marian Pepper MD sent at 6/5/2023  2:12 PM CDT -----  Please schedule 2 week post-op with KUB before visit

## 2023-08-09 ENCOUNTER — TELEPHONE (OUTPATIENT)
Dept: UROLOGY | Facility: CLINIC | Age: 53
End: 2023-08-09
Payer: MEDICARE

## 2023-08-09 NOTE — TELEPHONE ENCOUNTER
----- Message from Rubina Elder sent at 8/9/2023  2:45 PM CDT -----  Name of Who is Calling:Patient           What is the request in detail:Patient received missed call. I attempted to reach someone in the department to relay the emergency department message to the patient as schedulers are not advised to do so but was told that the patient needed to speak with the direct nurse that left the original . I attempted at getting an on call nurse but then was told that the on call nurse can not advise either and was then transferred to another . Patient then hung up after 22min. Please call patient as the encounter message says she is to go to the ER.           Can the clinic reply by [facility]:no           What Number to Call Back if not in [facility]: [phone]

## 2023-08-09 NOTE — TELEPHONE ENCOUNTER
----- Message from Zoie Walters sent at 8/9/2023  1:42 PM CDT -----  Contact: Amanda  Patient is calling to speak with the nurse as soon as possible regarding an appt. Reports being in severe pain and nausea. Reports needing to be seen today. Please give patient a call at [phone].

## 2023-08-09 NOTE — TELEPHONE ENCOUNTER
LVM to return call regarding concerns and symptoms, advised of severe pain and fever may need to head to the ER.

## 2023-08-10 ENCOUNTER — OFFICE VISIT (OUTPATIENT)
Dept: UROLOGY | Facility: CLINIC | Age: 53
End: 2023-08-10
Payer: MEDICARE

## 2023-08-10 ENCOUNTER — PATIENT MESSAGE (OUTPATIENT)
Dept: UROLOGY | Facility: CLINIC | Age: 53
End: 2023-08-10

## 2023-08-10 ENCOUNTER — TELEPHONE (OUTPATIENT)
Dept: UROLOGY | Facility: CLINIC | Age: 53
End: 2023-08-10
Payer: MEDICARE

## 2023-08-10 VITALS
DIASTOLIC BLOOD PRESSURE: 84 MMHG | SYSTOLIC BLOOD PRESSURE: 130 MMHG | RESPIRATION RATE: 18 BRPM | HEART RATE: 75 BPM | BODY MASS INDEX: 26.16 KG/M2 | WEIGHT: 167 LBS

## 2023-08-10 DIAGNOSIS — N20.0 RENAL STONES: ICD-10-CM

## 2023-08-10 DIAGNOSIS — N30.00 ACUTE CYSTITIS WITHOUT HEMATURIA: Primary | ICD-10-CM

## 2023-08-10 PROCEDURE — 99999PBSHW PR PBB SHADOW TECHNICAL ONLY FILED TO HB: Mod: PBBFAC,,,

## 2023-08-10 PROCEDURE — 99214 OFFICE O/P EST MOD 30 MIN: CPT | Mod: S$PBB,24,, | Performed by: NURSE PRACTITIONER

## 2023-08-10 PROCEDURE — 87186 SC STD MICRODIL/AGAR DIL: CPT | Performed by: NURSE PRACTITIONER

## 2023-08-10 PROCEDURE — 99213 OFFICE O/P EST LOW 20 MIN: CPT | Mod: PBBFAC | Performed by: NURSE PRACTITIONER

## 2023-08-10 PROCEDURE — 87086 URINE CULTURE/COLONY COUNT: CPT | Performed by: NURSE PRACTITIONER

## 2023-08-10 PROCEDURE — 99999PBSHW PR PBB SHADOW TECHNICAL ONLY FILED TO HB: ICD-10-PCS | Mod: PBBFAC,,,

## 2023-08-10 PROCEDURE — 87077 CULTURE AEROBIC IDENTIFY: CPT | Performed by: NURSE PRACTITIONER

## 2023-08-10 PROCEDURE — 99999 PR PBB SHADOW E&M-EST. PATIENT-LVL III: ICD-10-PCS | Mod: PBBFAC,,, | Performed by: NURSE PRACTITIONER

## 2023-08-10 PROCEDURE — 96372 THER/PROPH/DIAG INJ SC/IM: CPT | Mod: PBBFAC

## 2023-08-10 PROCEDURE — 99999 PR PBB SHADOW E&M-EST. PATIENT-LVL III: CPT | Mod: PBBFAC,,, | Performed by: NURSE PRACTITIONER

## 2023-08-10 PROCEDURE — 87088 URINE BACTERIA CULTURE: CPT | Performed by: NURSE PRACTITIONER

## 2023-08-10 PROCEDURE — 99214 PR OFFICE/OUTPT VISIT, EST, LEVL IV, 30-39 MIN: ICD-10-PCS | Mod: S$PBB,24,, | Performed by: NURSE PRACTITIONER

## 2023-08-10 RX ORDER — NITROFURANTOIN 25; 75 MG/1; MG/1
100 CAPSULE ORAL 2 TIMES DAILY
Qty: 10 CAPSULE | Refills: 0 | Status: SHIPPED | OUTPATIENT
Start: 2023-08-10 | End: 2023-08-10

## 2023-08-10 RX ORDER — SULFAMETHOXAZOLE AND TRIMETHOPRIM 800; 160 MG/1; MG/1
1 TABLET ORAL 2 TIMES DAILY
Qty: 20 TABLET | Refills: 0 | Status: SHIPPED | OUTPATIENT
Start: 2023-08-10 | End: 2023-08-13

## 2023-08-10 RX ORDER — CEFTRIAXONE 1 G/1
1 INJECTION, POWDER, FOR SOLUTION INTRAMUSCULAR; INTRAVENOUS
Status: COMPLETED | OUTPATIENT
Start: 2023-08-10 | End: 2023-08-10

## 2023-08-10 RX ADMIN — CEFTRIAXONE SODIUM 1 G: 1 INJECTION, POWDER, FOR SOLUTION INTRAMUSCULAR; INTRAVENOUS at 12:08

## 2023-08-10 NOTE — TELEPHONE ENCOUNTER
----- Message from Marcie Alaniz sent at 8/10/2023 10:01 AM CDT -----  Contact: Amanda  Pt is calling in regards to symptoms not getting any better. Pt stated may be bladder infection and now vomiting. Please call back at [phone]                                      Thanks  KT

## 2023-08-10 NOTE — TELEPHONE ENCOUNTER
Spoke with pt she stated that she is having a lot of UTI sxs, frequency, burning and right flank pain. She is also having a lot of nausea and vomiting, suggested that she been seen today by the NP since Dr. Pepper is in surgery and we can evaluate her better and get started on medications if need be. Pt v/u Scheduled for today with ANCA Villanueva at 11:40a

## 2023-08-10 NOTE — PROGRESS NOTES
Chief Complaint:   Urinary tract infection    HPI:   Patient is a 52-year-old female that is presenting with dysuria, pelvic pain and nausea.  Patient stated that symptoms started 2 days ago.  Denies fever or gross hematuria.  No fever in clinic.  Urine in clinic in indicates leukocytes, nitrates and blood.  All other parameters were negative.  Patient does have a history of renal stones, denies flank pain.Patient is S/P ESWL, lithotripsy,06/05/2023.    Allergies:  Patient has no known allergies.    Medications:  has a current medication list which includes the following prescription(s): amitriptyline, meloxicam, ondansetron, propranolol, vortioxetine, nitrofurantoin (macrocrystal-monohydrate), and valacyclovir.    Review of Systems:  General: No fever, + chills  Skin: No rashes, itching, or changes in color or texture of skin.  Chest: Denies BONILLA, cyanosis, wheezing, cough, and sputum production.  Abdomen: N/V and pelvic pain  Musculoskeletal: No flank pain  : As above.  All other review of systems negative.    PMH:   has a past medical history of Abnormal Pap smear of cervix, Fibromyalgia, Herpes simplex virus (HSV) infection, and Hypertension.    PSH:   has a past surgical history that includes Conization of cervix using loop electrosurgical excision procedure (LEEP) (1993); Lithotripsy; Neck surgery; Back surgery; and Extracorporeal shock wave lithotripsy (Left, 6/5/2023).    FamHx: family history includes Hypertension in her father and paternal grandmother; Hypothyroidism in her father.    SocHx:  reports that she has never smoked. She has never used smokeless tobacco. She reports current alcohol use. She reports that she does not use drugs.      Physical Exam:  Vitals:    08/10/23 1156   BP: 130/84   Pulse: 75   Resp: 18     General Appears ill  Neck: No masses, normal thyroid  Lungs: normal inspiration, no use of accessory muscles  Heart: normal pulse, no arrhythmias  Abdomen: Soft, NT, ND, no masses, no  hernias, no CVA tenderness  Labs/Studies:   See HPI    Impression/Plan:   Urinary tract infection with a history of renal stones  Urine was sent for culture.  Patient was given a injection of Rocephin 1 g in the clinic.   Empirically treated with Bactrim  History of renal stone and is s/p ESWL.  Will proceed with renal ultrasound.  Secure chatted Dr. Pepper for presentation of plan of care.  Patient is aware of the need to present to emergency department with fever, gross hematuria, nausea and vomiting ,not controlled with over -the-counter medication.

## 2023-08-10 NOTE — PROGRESS NOTES
Rocephin 1 mg given IM to (L) ventrogluteal . Tolerated injection well no adverse reactions noted.

## 2023-08-13 LAB — BACTERIA UR CULT: ABNORMAL

## 2023-08-13 RX ORDER — NITROFURANTOIN 25; 75 MG/1; MG/1
100 CAPSULE ORAL 2 TIMES DAILY
Qty: 20 CAPSULE | Refills: 0 | Status: SHIPPED | OUTPATIENT
Start: 2023-08-13

## 2023-08-14 ENCOUNTER — TELEPHONE (OUTPATIENT)
Dept: UROLOGY | Facility: CLINIC | Age: 53
End: 2023-08-14
Payer: MEDICARE

## 2023-08-14 NOTE — TELEPHONE ENCOUNTER
Called pt and informed her of the results and to stop the bactrim and start macrobid per torri.  ----- Message from Torri Leung NP sent at 8/13/2023  2:03 PM CDT -----  Please call patient and inform her that, based on final urine culture results, she needs to discontinue Bactrim.  Macrobid antibiotics have been sent to her local pharmacy.

## 2023-08-15 ENCOUNTER — TELEPHONE (OUTPATIENT)
Dept: UROLOGY | Facility: CLINIC | Age: 53
End: 2023-08-15
Payer: MEDICARE

## 2023-08-15 ENCOUNTER — HOSPITAL ENCOUNTER (OUTPATIENT)
Dept: RADIOLOGY | Facility: HOSPITAL | Age: 53
Discharge: HOME | End: 2023-08-15
Attending: NURSE PRACTITIONER
Payer: MEDICARE

## 2023-08-15 DIAGNOSIS — N20.0 RENAL STONES: ICD-10-CM

## 2023-08-15 PROCEDURE — 76770 US EXAM ABDO BACK WALL COMP: CPT | Mod: 26,,, | Performed by: RADIOLOGY

## 2023-08-15 PROCEDURE — 76770 US EXAM ABDO BACK WALL COMP: CPT | Mod: TC

## 2023-08-15 PROCEDURE — 76770 US RETROPERITONEAL COMPLETE: ICD-10-PCS | Mod: 26,,, | Performed by: RADIOLOGY

## 2023-08-15 NOTE — TELEPHONE ENCOUNTER
Spoke with patient she stated that she's still having pain in her back and groin and experiencing some nausea. She reports no chills or fever. Patient would like to know what she will need to do next.

## 2023-08-15 NOTE — TELEPHONE ENCOUNTER
----- Message from Sulma Steele sent at 8/15/2023 12:25 PM CDT -----  Contact: Amanda  .Type:  Patient Returning Call    Who Called:Amanda  Who Left Message for Patient:nurse  Does the patient know what this is regarding?:yes  Would the patient rather a call back or a response via [facility]? Call back  Best Call Back Number:[phone]  Additional Information: na        Thanks  SW         [id number] Exp Problem Focused - Mod. Complex

## 2023-08-21 ENCOUNTER — OFFICE VISIT (OUTPATIENT)
Dept: UROLOGY | Facility: CLINIC | Age: 53
End: 2023-08-21
Payer: MEDICARE

## 2023-08-21 VITALS
TEMPERATURE: 98 F | WEIGHT: 165.56 LBS | HEART RATE: 75 BPM | SYSTOLIC BLOOD PRESSURE: 124 MMHG | DIASTOLIC BLOOD PRESSURE: 80 MMHG | RESPIRATION RATE: 18 BRPM | HEIGHT: 67 IN | BODY MASS INDEX: 25.99 KG/M2

## 2023-08-21 DIAGNOSIS — N30.00 ACUTE CYSTITIS WITHOUT HEMATURIA: Primary | ICD-10-CM

## 2023-08-21 PROCEDURE — 99214 OFFICE O/P EST MOD 30 MIN: CPT | Mod: S$PBB,24,, | Performed by: NURSE PRACTITIONER

## 2023-08-21 PROCEDURE — 99214 OFFICE O/P EST MOD 30 MIN: CPT | Mod: PBBFAC | Performed by: NURSE PRACTITIONER

## 2023-08-21 PROCEDURE — 99999PBSHW PR PBB SHADOW TECHNICAL ONLY FILED TO HB: Mod: PBBFAC,,,

## 2023-08-21 PROCEDURE — 99999 PR PBB SHADOW E&M-EST. PATIENT-LVL IV: ICD-10-PCS | Mod: PBBFAC,,, | Performed by: NURSE PRACTITIONER

## 2023-08-21 PROCEDURE — 99214 PR OFFICE/OUTPT VISIT, EST, LEVL IV, 30-39 MIN: ICD-10-PCS | Mod: S$PBB,24,, | Performed by: NURSE PRACTITIONER

## 2023-08-21 PROCEDURE — 99999PBSHW PR PBB SHADOW TECHNICAL ONLY FILED TO HB: ICD-10-PCS | Mod: PBBFAC,,,

## 2023-08-21 PROCEDURE — 99999 PR PBB SHADOW E&M-EST. PATIENT-LVL IV: CPT | Mod: PBBFAC,,, | Performed by: NURSE PRACTITIONER

## 2023-08-21 PROCEDURE — 51798 US URINE CAPACITY MEASURE: CPT | Mod: PBBFAC

## 2023-08-21 PROCEDURE — 81003 URINALYSIS AUTO W/O SCOPE: CPT | Mod: PBBFAC

## 2023-08-21 NOTE — PROGRESS NOTES
Chief Complaint:   Renal stones  Pyelonephritis    HPI:   Patient is presenting as a follow-up to pyelonephritis.  Patient was recently seen for positive urine culture indicating Enterococcus faecalis.  Patient had fever, nausea, vomiting and flank pain.  Was given 1 g of Rocephin and treated with Bactrim.  Patient states that symptoms have resolved, except for flank pain.  Patient presented to [facility] ED, CT scan renal stone study indicated bilateral, nonobstructing renal stones.  Urine in clinic is negative.  08/10/2023  Patient is a 52-year-old female that is presenting with dysuria, pelvic pain and nausea.  Patient stated that symptoms started 2 days ago.  Denies fever or gross hematuria.  No fever in clinic.  Urine in clinic in indicates leukocytes, nitrates and blood.  All other parameters were negative.  Patient does have a history of renal stones, denies flank pain.Patient is S/P ESWL, lithotripsy,06/05/2023 ( Dr. Pepper ).   Allergies:  Duloxetine    Medications:  has a current medication list which includes the following prescription(s): amitriptyline, iron/fa/dha/epa/fad/nadh/mv47, nitrofurantoin (macrocrystal-monohydrate), propranolol, vortioxetine, meloxicam, ondansetron, and valacyclovir.    Review of Systems:  General: No fever, chills.  Skin: No rashes, itching, or changes in color or texture of skin.  Chest: Denies BONILLA, cyanosis, wheezing, cough, and sputum production.  Abdomen: Appetite fine. No weight loss. Denies diarrhea, abdominal pain, hematemesis, or blood in stool.  Musculoskeletal: Flank pain  : As above.  All other review of systems negative.    PMH:   has a past medical history of Abnormal Pap smear of cervix, Fibromyalgia, Herpes simplex virus (HSV) infection, and Hypertension.    PSH:   has a past surgical history that includes Conization of cervix using loop electrosurgical excision procedure (LEEP) (1993); Lithotripsy; Neck surgery; Back surgery; and Extracorporeal  shock wave lithotripsy (Left, 6/5/2023).    FamHx: family history includes Hypertension in her father and paternal grandmother; Hypothyroidism in her father.    SocHx:  reports that she has never smoked. She has never used smokeless tobacco. She reports current alcohol use. She reports that she does not use drugs.      Physical Exam:  Vitals:    08/21/23 0851   BP: 124/80   Pulse: 75   Resp: 18   Temp: 97.9 °F (36.6 °C)     General: A&Ox3, no apparent distress, no deformities  Neck: No masses, normal thyroid  Lungs: normal inspiration, no use of accessory muscles  Heart: normal pulse, no arrhythmias  Abdomen: Soft, NT, ND, no masses, no hernias, no CVA tenderness  Labs/Studies:   08/15/2023  US RETROPERITONEAL COMPLETE     CLINICAL HISTORY:  Calculus of kidney     TECHNIQUE:  Ultrasound of the kidneys and urinary bladder was performed including color flow and Doppler evaluation of the kidneys.     COMPARISON:  None     FINDINGS:  The right kidney measures 14.1 cm. The left kidney measures 11.5 cm.  Multiple echogenic foci seen scattered throughout both kidneys measuring 7 mm, 5 mm and 6 mm on the right and 3 mm, 3 mm and 5 mm on the left.  Some of these echogenic foci do demonstrates shadowing and therefore are suspicious for nonobstructing calculi.  No solid or cystic masses. No hydronephrosis.The bladder is unremarkable in appearance.     Impression:     1. Findings most consistent with bilateral nonobstructing renal calculi.  Impression/Plan:   1. Pyelonephritis  Urine in clinic is negative and patient has a resolution to pyelonephritis symptoms except flank pain.    2. Bilateral, nonobstructing renal stones  Patient is concerned that flank pain is being caused by bilateral, nonobstructing renal stones.  Is requesting a possible surgical option, scheduled an MD to discuss.  Patient is following with GI secondary to gallstones.

## 2023-08-28 ENCOUNTER — OFFICE VISIT (OUTPATIENT)
Dept: UROLOGY | Facility: CLINIC | Age: 53
End: 2023-08-28
Payer: MEDICARE

## 2023-08-28 VITALS
RESPIRATION RATE: 16 BRPM | HEART RATE: 105 BPM | BODY MASS INDEX: 26.37 KG/M2 | WEIGHT: 168 LBS | SYSTOLIC BLOOD PRESSURE: 113 MMHG | HEIGHT: 67 IN | DIASTOLIC BLOOD PRESSURE: 79 MMHG

## 2023-08-28 DIAGNOSIS — N20.0 RENAL STONES: Primary | ICD-10-CM

## 2023-08-28 DIAGNOSIS — N30.00 ACUTE CYSTITIS WITHOUT HEMATURIA: ICD-10-CM

## 2023-08-28 DIAGNOSIS — R31.29 MICROHEMATURIA: ICD-10-CM

## 2023-08-28 PROBLEM — N39.0 RECURRENT UTI: Status: RESOLVED | Noted: 2023-05-29 | Resolved: 2023-08-28

## 2023-08-28 PROCEDURE — 99999 PR PBB SHADOW E&M-EST. PATIENT-LVL IV: CPT | Mod: PBBFAC,,, | Performed by: UROLOGY

## 2023-08-28 PROCEDURE — 99214 OFFICE O/P EST MOD 30 MIN: CPT | Mod: PBBFAC | Performed by: UROLOGY

## 2023-08-28 PROCEDURE — 99214 PR OFFICE/OUTPT VISIT, EST, LEVL IV, 30-39 MIN: ICD-10-PCS | Mod: S$PBB,24,, | Performed by: UROLOGY

## 2023-08-28 PROCEDURE — 99214 OFFICE O/P EST MOD 30 MIN: CPT | Mod: S$PBB,24,, | Performed by: UROLOGY

## 2023-08-28 PROCEDURE — 99999 PR PBB SHADOW E&M-EST. PATIENT-LVL IV: ICD-10-PCS | Mod: PBBFAC,,, | Performed by: UROLOGY

## 2023-08-28 RX ORDER — SOLIFENACIN SUCCINATE 5 MG/1
5 TABLET, FILM COATED ORAL DAILY
Qty: 30 TABLET | Refills: 11 | Status: SHIPPED | OUTPATIENT
Start: 2023-08-28 | End: 2024-08-27

## 2023-08-28 NOTE — PROGRESS NOTES
Chief Complaint:   Encounter Diagnoses   Name Primary?    Renal stones Yes    Acute cystitis without hematuria     Microhematuria            HPI:   8/28/23- patient comes in to see me for the 1st time today.  In June she underwent an ESWL by Dr. Pepper, since then she is had some concerns about persistent stones.  She states the infection she only gets when she passes a stone, she is had stones since the age of 18.  She is had multiple surgeries as many as 8, occasionally she is passed stones but not frequently.  No other urological gynecological history per her report.  She does have increased frequency and urgency, fibromyalgia appears to exacerbate her urinary symptoms.    8/21/23- LR- Patient is presenting as a follow-up to pyelonephritis.  Patient was recently seen for positive urine culture indicating Enterococcus faecalis.  Patient had fever, nausea, vomiting and flank pain.  Was given 1 g of Rocephin and treated with Bactrim.  Patient states that symptoms have resolved, except for flank pain.  Patient presented to [facility] ED, CT scan renal stone study indicated bilateral, nonobstructing renal stones.  Urine in clinic is negative.      Allergies:  Duloxetine    Medications:  has a current medication list which includes the following prescription(s): amitriptyline, iron/fa/dha/epa/fad/nadh/mv47, meloxicam, nitrofurantoin (macrocrystal-monohydrate), ondansetron, propranolol, valacyclovir, and vortioxetine.    Review of Systems:  General: No fever, chills.  Skin: No rashes, itching, or changes in color or texture of skin.  Chest: Denies BONILLA, cyanosis, wheezing, cough, and sputum production.  Abdomen: Appetite fine. No weight loss. Denies diarrhea, abdominal pain, hematemesis, or blood in stool.  Musculoskeletal: Flank pain  : As above.  All other review of systems negative.    PMH:   has a past medical history of Abnormal Pap smear of cervix, Fibromyalgia, Herpes simplex virus (HSV) infection,  and Hypertension.    PSH:   has a past surgical history that includes Conization of cervix using loop electrosurgical excision procedure (LEEP) (1993); Lithotripsy; Neck surgery; Back surgery; and Extracorporeal shock wave lithotripsy (Left, 6/5/2023).    FamHx: family history includes Hypertension in her father and paternal grandmother; Hypothyroidism in her father.    SocHx:  reports that she has never smoked. She has never used smokeless tobacco. She reports current alcohol use. She reports that she does not use drugs.      Physical Exam:  There were no vitals filed for this visit.  General: A&Ox3, no apparent distress, no deformities  Neck: No masses, normal ROM  Lungs: normal inspiration, no use of accessory muscles  Heart: normal pulse, no arrhythmias  Abdomen: Soft, NT, ND, no masses, no hernias, no hepatosplenomegaly  Skin: The skin is warm and dry. No jaundice.  Ext: No c/c/e.    Labs/Studies:   Stone analysis 50% Ca oxalate monohydrate, 30% apatite, 20% Ca oxalate dihydrate 6/20  SUSANA questionable bilateral renal stones 8/23  CT stone protocol bilateral renal stones, left 1 cm renal stone 5/23  Creatinine 0.9 5/23    Impression/Plan:       1. Pyelonephritis- her UTIs usually only appear during times of stone passage, currently doing well, call with a recurrence.    2. Bilateral, nonobstructing renal stones-  left ESWL  6/5/23    Patient will bring in her disc, so we can see how large the stones are that are remaining.  In addition whether surgical management is warranted at this juncture.  In addition she would like to proceed with a stone workup therefore proceed with 24 hour urine, call with the results.  Otherwise we discussed dietary management today which she will follow.    3. Urgency- relatively early findings, attempt a VESIcare 5 mg trial.  I have informed her this can cause dry mouth, dry eyes, urinary retention or constipation.  If she has any issues she is to stop the medication and contact our  office, otherwise see me in 2 months with a postvoid residual.  Call with any concerns prior to that appointment.

## 2024-02-15 ENCOUNTER — TELEPHONE (OUTPATIENT)
Dept: UROLOGY | Facility: CLINIC | Age: 54
End: 2024-02-15
Payer: MEDICARE

## 2024-02-15 NOTE — TELEPHONE ENCOUNTER
02/15/2024 0835 - Outgoing call attempted to schedule patient for requested virtual visit with Dr. Pepper but no answer, no VM, will try again later. Virtual visit scheduled for first available next week. Once I speak will the patient, I will confirm if this appointment time works for her or if she needs it changed.    Frandy Tim RN     ----- Message from Marian Pepper MD sent at 2/14/2024  8:15 PM CST -----  Contact: lane  Yes, that is fine.   ----- Message -----  From: Flako Doshi MA  Sent: 2/14/2024   7:59 AM CST  To: Marian Pepper MD    Is it okay to scheduled virtual visit for sometime next week?  ----- Message -----  From: Bridgett Gold  Sent: 2/14/2024   7:48 AM CST  To: Evgeny MILLER Staff    Patient is calling to schedule a virtual visit for kidney stone pain. Please call her back at [phone] .        Thanks  DD

## 2024-02-15 NOTE — TELEPHONE ENCOUNTER
Outgoing call attempted to schedule patient for requested virtual visit with Dr. Pepper but no answer, LVM. Virtual visit scheduled for first available next week. Left contact information for patient to give us a call back if she has any question/concerns regarding the appointment.     Frandy Tim RN

## 2024-02-26 ENCOUNTER — TELEPHONE (OUTPATIENT)
Dept: UROLOGY | Facility: CLINIC | Age: 54
End: 2024-02-26
Payer: MEDICARE

## 2024-02-26 NOTE — TELEPHONE ENCOUNTER
Pt notified us that she had to cancel her appt w/ Dr. Pepper to take care of a cyst on her back that is causing her severe pain. I notified the pt that she is more than welcome to reach back out to us when she is feeling better and ready to reschedule. Pt vu

## 2024-07-22 ENCOUNTER — PATIENT MESSAGE (OUTPATIENT)
Dept: UROLOGY | Facility: CLINIC | Age: 54
End: 2024-07-22
Payer: MEDICARE

## 2024-07-22 ENCOUNTER — TELEPHONE (OUTPATIENT)
Dept: UROLOGY | Facility: CLINIC | Age: 54
End: 2024-07-22
Payer: MEDICARE

## 2024-07-22 NOTE — TELEPHONE ENCOUNTER
.Outgoing call placed to patient, patient verified name and date of birth, patient wanted to know there were any sooner apptointment available, notified patient that she is already scheduled for the soonest possible but was placed on wait list as requested.        ----- Message from Maryann Green sent at 7/22/2024 10:10 AM CDT -----  Contact: Pt [phone]  Would like to receive medical advice.  Would they like a call back or a response via [facility]:  Call back  Additional information:      The pt is calling to speak with the office regarding kidney stones she has.

## 2024-07-29 ENCOUNTER — OFFICE VISIT (OUTPATIENT)
Dept: UROLOGY | Facility: CLINIC | Age: 54
End: 2024-07-29
Payer: MEDICARE

## 2024-07-29 VITALS
RESPIRATION RATE: 18 BRPM | BODY MASS INDEX: 26.47 KG/M2 | DIASTOLIC BLOOD PRESSURE: 87 MMHG | HEART RATE: 89 BPM | SYSTOLIC BLOOD PRESSURE: 137 MMHG | WEIGHT: 168.63 LBS | HEIGHT: 67 IN

## 2024-07-29 DIAGNOSIS — N20.0 RENAL STONES: Primary | ICD-10-CM

## 2024-07-29 DIAGNOSIS — R10.9 FLANK PAIN: ICD-10-CM

## 2024-07-29 DIAGNOSIS — N32.81 OAB (OVERACTIVE BLADDER): ICD-10-CM

## 2024-07-29 PROCEDURE — 99214 OFFICE O/P EST MOD 30 MIN: CPT | Mod: S$PBB,,, | Performed by: UROLOGY

## 2024-07-29 PROCEDURE — 99999 PR PBB SHADOW E&M-EST. PATIENT-LVL III: CPT | Mod: PBBFAC,,, | Performed by: UROLOGY

## 2024-07-29 PROCEDURE — 99213 OFFICE O/P EST LOW 20 MIN: CPT | Mod: PBBFAC | Performed by: UROLOGY

## 2024-07-29 NOTE — PROGRESS NOTES
CC: Check up bladder and kidney stones    History of Present Illness:   Amanda Vega is a 53 y.o. female here for follow up    7/29/24-Pt recently had spinal stenosis surgery a little over a month ago. She has been having some right sided stone pain lately. No gross hematuria. She was recently treated with cephalexin following her surgery. No current dysuria or bladder issues. Takes vesicare when her frequency increases.     5/29/23- Pt reports that she has been having some LLQ pain and sharp pain in her bottom. She also reports some right flank pain. No fever. CT scan was personally reviewed, showing a 10mm L renal pelvis stone and bilateral non-obstructing renal stones. HU measure <1000.   5/1/23-She has been treated for 3 UTIs over the past 2 months. Main symptoms were pain in her body and she just thought it was her fibromyalgia. No gross hematuria. No dysuria. She does have somewhat of a sharp pain in her vagina. She was treated with amoxicillin 3 times. No urinary frequency, but she does have some vaginal itching and foul odor. Slight sharp flank pain on the right, but it went away. No recent stone passage.   7/23/21-She states that she had a UTI recently. Was placed on Macrobid. No dysuria currently. No recent kidney stone passage. Drinking more water. No gross hematuria. +frequency, but not bothersome.   Father recently passed from covid.       Review of Systems   Constitutional:  Negative for chills and fever.   Respiratory:  Negative for shortness of breath.    Cardiovascular:  Negative for chest pain.   Gastrointestinal:  Negative for abdominal pain.   Genitourinary:  Negative for hematuria.   Musculoskeletal:  Positive for arthralgias.   Psychiatric/Behavioral:  Negative for confusion.    All other systems reviewed and are negative.      Current Outpatient Medications   Medication Sig Dispense Refill    amitriptyline (ELAVIL) 100 MG tablet Take 1 tablet by mouth every evening.       iron/FA/dha/epa/FAD/NADH/mv47 (ENLYTE ORAL) Take by mouth.      nitrofurantoin, macrocrystal-monohydrate, (MACROBID) 100 MG capsule Take 1 capsule (100 mg total) by mouth 2 (two) times daily. 20 capsule 0    propranoloL (INDERAL LA) 160 mg 24 hr capsule Take 1 capsule by mouth every evening.      solifenacin (VESICARE) 5 MG tablet Take 1 tablet (5 mg total) by mouth once daily. 30 tablet 11    vortioxetine (TRINTELLIX) 10 mg Tab Take 10 mg by mouth Daily.      meloxicam (MOBIC) 15 MG tablet Take 15 mg by mouth once daily.      ondansetron (ZOFRAN-ODT) 4 MG TbDL Dissolve 1 tablet (4 mg total) by mouth every 8 (eight) hours as needed (nausea). 20 tablet 0    valACYclovir (VALTREX) 500 MG tablet Take 1 tablet (500 mg total) by mouth 2 (two) times daily. for 3 days 6 tablet 2     No current facility-administered medications for this visit.       Review of patient's allergies indicates:   Allergen Reactions    Duloxetine      DIZZINESS       Past medical, family, and social history reviewed as documented in chart with pertinent positive medical, family, and social history detailed in HPI.    Physical Exam  Vitals:    07/29/24 1153   BP: 137/87   Pulse: 89   Resp: 18       General: Well-developed, Well Nourished in No acute distress  HEENT: Normocephalic, Atraumatic, Extraocular Movements intact  Neck: Supple, trachea midline, no cervical or supraclavicular lymphadenopathy  Respirations: Even and unlabored  Back: Midline spine without tenderness, R lower back/SI tenderness, no CVA tenderness  Abdomen: Soft, RLQ tenderness, non-distended, no hepatosplenomegaly, no rebound or guarding, no palpable masses  Extremities: Moves all equally, atraumatic, no clubbing, cyanosis or edema  Skin: warm and dry  Psych: normal affect  Neuro: Alert and oriented x 3, Cranial nerves II-XII grossly intact      Assesment:   1. Renal stones  US Retroperitoneal Complete    X-Ray Abdomen AP 1 View      2. OAB (overactive bladder)        3. Flank  pain                  Plan:  Renal stones  -     US Retroperitoneal Complete; Future; Expected date: 07/29/2024  -     X-Ray Abdomen AP 1 View; Future; Expected date: 07/29/2024    OAB (overactive bladder)    Flank pain      Take vesicare prn    Called pt with the KUB and US results following the visit. She does appear to have fairly large non-obstructing renal stones. She would like an ESWL without stent, understanding the risk of obstruction. We discussed scheduling in 2 weeks and she states that she can't wait that long. Discussed that the non-obstructing stones shouldn't be causing her pain so severe. Will get CT scan.

## 2024-07-30 ENCOUNTER — TELEPHONE (OUTPATIENT)
Dept: UROLOGY | Facility: CLINIC | Age: 54
End: 2024-07-30
Payer: MEDICARE

## 2024-07-30 NOTE — TELEPHONE ENCOUNTER
.Outgoing call placed to patient, patient verified name and date of birth, patient wanted to know when the results would get to the MD, notified that it is usually same day and once the doctor receives and reviews the report that she will be contacted by one of our nursing staff.       ----- Message from Samira Zurita sent at 7/30/2024  3:54 PM CDT -----  Type:  Needs Medical Advice    Who Called: Amanda  Symptoms (please be specific): kidney stones   How long has patient had these symptoms:    Pharmacy name and phone #:    Would the patient rather a call back or a response via [facility]? Call back  Best Call Back Number: [phone]  Additional Information:       Patient called in regards to have someone in the off to give her a call

## 2024-07-31 ENCOUNTER — HOSPITAL ENCOUNTER (OUTPATIENT)
Dept: RADIOLOGY | Facility: HOSPITAL | Age: 54
Discharge: HOME | End: 2024-07-31
Attending: UROLOGY
Payer: MEDICARE

## 2024-07-31 ENCOUNTER — TELEPHONE (OUTPATIENT)
Dept: UROLOGY | Facility: CLINIC | Age: 54
End: 2024-07-31
Payer: MEDICARE

## 2024-07-31 DIAGNOSIS — N20.0 RENAL STONES: ICD-10-CM

## 2024-07-31 DIAGNOSIS — N22 CALCULUS OF URINARY TRACT IN DISEASES CLASSIFIED ELSEWHERE: Primary | ICD-10-CM

## 2024-07-31 PROCEDURE — 74018 RADEX ABDOMEN 1 VIEW: CPT | Mod: TC,FY,PO

## 2024-07-31 PROCEDURE — 76770 US EXAM ABDO BACK WALL COMP: CPT | Mod: 26,,, | Performed by: RADIOLOGY

## 2024-07-31 PROCEDURE — 74018 RADEX ABDOMEN 1 VIEW: CPT | Mod: 26,,, | Performed by: RADIOLOGY

## 2024-07-31 PROCEDURE — 76770 US EXAM ABDO BACK WALL COMP: CPT | Mod: TC,PO

## 2024-07-31 NOTE — TELEPHONE ENCOUNTER
----- Message from Mega Childers MA sent at 7/31/2024  4:43 PM CDT -----  Pt is inquiring about her Xray and US results.  ----- Message -----  From: Geremias Robles  Sent: 7/31/2024   4:39 PM CDT  To: Evgeny Mary    The pt Is calling to get her test results back on her US and X-rays. Please give a call back at [phone]

## 2024-07-31 NOTE — TELEPHONE ENCOUNTER
Called pt with KUB and renal US results showing non-obstructing renal stones. Pt wants ASAP ESWL without a stent. Discussed CT and will schedule.

## 2024-08-01 ENCOUNTER — TELEPHONE (OUTPATIENT)
Dept: UROLOGY | Facility: CLINIC | Age: 54
End: 2024-08-01
Payer: MEDICARE

## 2024-08-01 NOTE — TELEPHONE ENCOUNTER
Tried contacting patient reference to scheduling STAT CT scan. There was no answer, left voicemail stating that STAT CT scan have been scheduled for tomorrow August 2nd, 2024 for 9:30AM at the [place] location and if date or time does not work please give office a return call to get appointment rescheduled at [phone].

## 2024-08-13 ENCOUNTER — TELEPHONE (OUTPATIENT)
Dept: INTERNAL MEDICINE | Facility: CLINIC | Age: 54
End: 2024-08-13
Payer: MEDICARE

## 2024-08-13 NOTE — TELEPHONE ENCOUNTER
----- Message from Braulio Galeano sent at 8/13/2024  4:14 PM CDT -----  Contact: Amanda Patel is needing a call back to schedule new patient appointment. Please call back at [phone].    Thank you braulio

## 2024-08-15 ENCOUNTER — TELEPHONE (OUTPATIENT)
Dept: UROLOGY | Facility: CLINIC | Age: 54
End: 2024-08-15
Payer: MEDICARE

## 2024-08-15 NOTE — TELEPHONE ENCOUNTER
----- Message from Louise Corona sent at 8/15/2024  1:09 PM CDT -----  Regarding: sooner apt  Contact: patient  Type:  Sooner Appointment Request    Caller is requesting a sooner appointment.  Caller declined first available appointment listed below.  Caller will not accept being placed on the waitlist and is requesting a message be sent to doctor.    Name of Caller:  patient  When is the first available appointment?    Symptoms:  kidney stones  Would the patient rather a call back or a response via [facility]?   Best Call Back Number:  [phone]    Additional Information:  needs to be seen for the following above as soon as possible thanks

## 2024-08-15 NOTE — TELEPHONE ENCOUNTER
Spoke with patient reference to scheduling CT Scan. Patient will; conduct CT on 08/16/2024 for 2:30PM at the [place] location. Patient verbalized understanding.

## 2024-08-16 ENCOUNTER — HOSPITAL ENCOUNTER (OUTPATIENT)
Dept: RADIOLOGY | Facility: HOSPITAL | Age: 54
Discharge: HOME | End: 2024-08-16
Attending: UROLOGY
Payer: MEDICARE

## 2024-08-16 DIAGNOSIS — N22 CALCULUS OF URINARY TRACT IN DISEASES CLASSIFIED ELSEWHERE: ICD-10-CM

## 2024-08-16 PROCEDURE — 74176 CT ABD & PELVIS W/O CONTRAST: CPT | Mod: TC

## 2024-08-16 PROCEDURE — 74176 CT ABD & PELVIS W/O CONTRAST: CPT | Mod: 26,,, | Performed by: STUDENT IN AN ORGANIZED HEALTH CARE EDUCATION/TRAINING PROGRAM

## 2024-08-20 ENCOUNTER — OFFICE VISIT (OUTPATIENT)
Dept: UROLOGY | Facility: CLINIC | Age: 54
End: 2024-08-20
Payer: MEDICARE

## 2024-08-20 ENCOUNTER — TELEPHONE (OUTPATIENT)
Dept: UROLOGY | Facility: CLINIC | Age: 54
End: 2024-08-20
Payer: MEDICARE

## 2024-08-20 ENCOUNTER — TELEPHONE (OUTPATIENT)
Dept: UROLOGY | Facility: CLINIC | Age: 54
End: 2024-08-20

## 2024-08-20 DIAGNOSIS — N20.0 RIGHT RENAL STONE: Primary | ICD-10-CM

## 2024-08-20 DIAGNOSIS — N32.81 OAB (OVERACTIVE BLADDER): ICD-10-CM

## 2024-08-20 DIAGNOSIS — R10.9 FLANK PAIN: ICD-10-CM

## 2024-08-20 RX ORDER — CIPROFLOXACIN 2 MG/ML
400 INJECTION, SOLUTION INTRAVENOUS
Status: CANCELLED | OUTPATIENT
Start: 2024-08-20

## 2024-08-20 RX ORDER — OXYCODONE AND ACETAMINOPHEN 10; 325 MG/1; MG/1
1 TABLET ORAL EVERY 8 HOURS PRN
Qty: 21 TABLET | Refills: 0 | Status: ON HOLD | OUTPATIENT
Start: 2024-08-20 | End: 2024-08-22

## 2024-08-20 NOTE — TELEPHONE ENCOUNTER
Outgoing call placed to [facility] to schedule ESWL procedure, spoke with Aj who scheduled the case and provided confirmation #[id number] of booking for surgery.

## 2024-08-20 NOTE — H&P (VIEW-ONLY)
The patient location is: [place]  The chief complaint leading to consultation is: follow up kidney stone    Visit type: audiovisual    Face to Face time with patient: 15 minutes  30 minutes of total time spent on the encounter, which includes face to face time and non-face to face time preparing to see the patient (eg, review of tests), Obtaining and/or reviewing separately obtained history, Documenting clinical information in the electronic or other health record, Independently interpreting results (not separately reported) and communicating results to the patient/family/caregiver, or Care coordination (not separately reported).     Each patient to whom he or she provides medical services by telemedicine is:  (1) informed of the relationship between the physician and patient and the respective role of any other health care provider with respect to management of the patient; and (2) notified that he or she may decline to receive medical services by telemedicine and may withdraw from such care at any time.    Notes:       CC: follow up kidney stones    History of Present Illness:   Amanda Vega is a 53 y.o. female here for follow up    8/20/24-CT scan dated 8/16/24 personally reviewed and shows a 1.3cm R renal pelvic stone. She is having stabbing pain from her neck to lower back. Seems to be worse at night. Having difficulty going up her stairs. No nausea/vomiting yet. No fever.     7/29/24-Pt recently had spinal stenosis surgery a little over a month ago. She has been having some right sided stone pain lately. No gross hematuria. She was recently treated with cephalexin following her surgery. No current dysuria or bladder issues. Takes vesicare when her frequency increases.     5/29/23- Pt reports that she has been having some LLQ pain and sharp pain in her bottom. She also reports some right flank pain. No fever. CT scan was personally reviewed, showing a 10mm L renal pelvis stone and bilateral  non-obstructing renal stones. HU measure <1000.   5/1/23-She has been treated for 3 UTIs over the past 2 months. Main symptoms were pain in her body and she just thought it was her fibromyalgia. No gross hematuria. No dysuria. She does have somewhat of a sharp pain in her vagina. She was treated with amoxicillin 3 times. No urinary frequency, but she does have some vaginal itching and foul odor. Slight sharp flank pain on the right, but it went away. No recent stone passage.   7/23/21-She states that she had a UTI recently. Was placed on Macrobid. No dysuria currently. No recent kidney stone passage. Drinking more water. No gross hematuria. +frequency, but not bothersome.   Father recently passed from covid.       Review of Systems   Constitutional:  Negative for chills and fever.   Respiratory:  Negative for shortness of breath.    Cardiovascular:  Negative for chest pain.   Gastrointestinal:  Negative for abdominal pain.   Genitourinary:  Negative for hematuria.   Musculoskeletal:  Positive for arthralgias.   Psychiatric/Behavioral:  Negative for confusion.    All other systems reviewed and are negative.      Current Outpatient Medications   Medication Sig Dispense Refill    amitriptyline (ELAVIL) 100 MG tablet Take 1 tablet by mouth every evening.      propranoloL (INDERAL LA) 160 mg 24 hr capsule Take 1 capsule by mouth every evening.      iron/FA/dha/epa/FAD/NADH/mv47 (ENLYTE ORAL) Take by mouth.      oxyCODONE-acetaminophen (PERCOCET)  mg per tablet Take 1 tablet by mouth every 8 (eight) hours as needed for Pain. 21 tablet 0    solifenacin (VESICARE) 5 MG tablet Take 1 tablet (5 mg total) by mouth once daily. 30 tablet 11    vortioxetine (TRINTELLIX) 10 mg Tab Take 10 mg by mouth Daily.       No current facility-administered medications for this visit.       Review of patient's allergies indicates:   Allergen Reactions    Duloxetine      DIZZINESS       Past medical, family, and social history reviewed  as documented in chart with pertinent positive medical, family, and social history detailed in HPI.    Physical Exam  There were no vitals filed for this visit.      General: Well-developed, Well Nourished in No acute distress  HEENT: Normocephalic, Atraumatic, Extraocular Movements intact  Neck: Supple, trachea midline, no cervical or supraclavicular lymphadenopathy  Respirations: Even and unlabored  Extremities: Moves all equally, atraumatic, no clubbing, cyanosis or edema  Skin: warm and dry  Psych: normal affect  Neuro: Alert and oriented x 3, Cranial nerves II-XII grossly intact      Assesment:   1. Right renal stone  Place in Outpatient    Case Request Operating Room: LITHOTRIPSY, ESWL    Vital Signs     Diet NPO    Place sequential compression device      2. OAB (overactive bladder)        3. Flank pain                    Plan:  Right renal stone  -     Place in Outpatient; Standing  -     Case Request Operating Room: LITHOTRIPSY, ESWL  -     Vital Signs ; Standing  -     Diet NPO; Standing  -     Place sequential compression device; Standing    OAB (overactive bladder)    Flank pain    Other orders  -     oxyCODONE-acetaminophen (PERCOCET)  mg per tablet; Take 1 tablet by mouth every 8 (eight) hours as needed for Pain.  Dispense: 21 tablet; Refill: 0  -     IP VTE LOW RISK PATIENT; Standing  -     ciprofloxacin (CIPRO)400mg/200ml D5W IVPB 400 mg      Continue vesicare  Risks/benefits of USE with stent vs ESWL with stent vs unstented ESWL discussed. Pt strongly opposed to a stent up front and would like to try ESWL without stent. Risk of steinstrasse and need for urgent intervention discussed. Will plan for ESWL on 8/22/24.

## 2024-08-20 NOTE — Clinical Note
Pt needs to be scheduled for ESWL at 7am on this Thursday, 8/22/24. Please have the OR adjust the times and schedule with Klaus

## 2024-08-20 NOTE — TELEPHONE ENCOUNTER
.Outgoing call placed to patient, patient verified name and date of birth, patient scheduled for virtual today to discuss CT results.      ----- Message from Ilene Mackay sent at 8/20/2024 11:23 AM CDT -----  Contact: Arina, [phone]  2TESTRESULTS    Type: Test Results    What test was performed? CT Scan    Who ordered the test? Dr Pepper    When and where were the test performed? 08/16/2024    Would you like a call back and or thru [facility]: Call back    Comments: Please call her. Thanks.

## 2024-08-21 ENCOUNTER — TELEPHONE (OUTPATIENT)
Dept: UROLOGY | Facility: CLINIC | Age: 54
End: 2024-08-21
Payer: MEDICARE

## 2024-08-21 ENCOUNTER — TELEPHONE (OUTPATIENT)
Dept: PREADMISSION TESTING | Facility: HOSPITAL | Age: 54
End: 2024-08-21
Payer: MEDICARE

## 2024-08-21 DIAGNOSIS — N20.0 RENAL STONES: Primary | ICD-10-CM

## 2024-08-21 DIAGNOSIS — N20.0 RIGHT RENAL STONE: ICD-10-CM

## 2024-08-21 DIAGNOSIS — Z01.818 PRE-OP TESTING: ICD-10-CM

## 2024-08-21 RX ORDER — MULTIVITAMIN
1 TABLET ORAL NIGHTLY
COMMUNITY

## 2024-08-21 NOTE — PRE-PROCEDURE INSTRUCTIONS
Pre op instructions reviewed with patient over telephone, verbalized understanding.    To confirm, Surgery is scheduled on 8/22/24, arrive at 10:45am. We will call you late afternoon the business day prior to surgery with your arrival time.    *Please report to the [facility] (1st Floor) located off of [facility] (2nd Entrance/Building on the left, in front of the flag pole).  Address: [address] [place] [address] [place] [address]      INSTRUCTIONS IMPORTANT!!!  DO NOT Eat, Drink, or Smoke after 12 midnight unless instructed otherwise by your Surgeon. OK to brush teeth, no gum, candy or mints!    >>>MEDICATION INSTRUCTIONS<<<: Morning of Surgery, take small sip of water with ONLY these medications:  none    *Diabetic/ Prediabetic Patients: !!!If you take diabetic or weight loss medication, Do NOT take morning of surgery unless instructed by Doctor!!!  Metformin to be stopped 24 hrs prior to surgery.   Long Acting Insulin Instructions: HOLD the night before surgery unless instructed differently by Provider!    If your are taking Ozempic/ Mounjaro/ Wegovy/ Trulicity/ Semaglutide injections or weight loss medication please notify us immediately as they must be stopped 7 days prior to surgery.    !!!STOP ALL Aspirins, NSAIDS, WEIGHT LOSS INJECTIONS/PILLS, Herbal supplements, & Vitamins 7 DAYS BEFORE SURGERY!!!    ____  Avoid Alcoholic beverages 3 days prior to surgery, as it can thin the blood.  ____  NO Acrylic/fake nails or nail polish worn day of surgery (specifically hand/arm & foot surgeries).  ____  NO powder, lotions, deodorants, oils or cream on body.  ____  Remove all jewelry & piercings & foreign objects before arrival & leave at home.  ____  Remove Dentures, Hearing Aids & Contact Lens prior to surgery.  ____  Bring photo ID and insurance information to hospital (Leave Valuables at Home).  ____  If going home the same day, arrange for a ride home. You will not be able to drive for 24 hrs if  Anesthesia was used.   ____  Females (ages 11-60): may need to give a urine sample the morning of surgery; please see Pre op Nurse prior to using the restroom.  ____  Males: Stop ED medications (Viagra, Cialis) 24 hrs prior to surgery.  ____  Wear clean, loose fitting clothing to allow for dressings/ bandages.      Bathing Instructions:    -Shower with anti-bacterial Soap (Hibiclens or Dial) the night before surgery and the morning of.   -Do not use Hibiclens on your face or genitals.   -Apply clean clothes after shower.  -Do not shave your face or body 3 days prior to surgery unless instructed otherwise by your Surgeon.    [facility] Vis[facility]/Ride Policy:  Only 2 adults allowed in pre op/recovery area during your procedure. You MUST HAVE A RIDE HOME from a responsible adult that you know and trust. Medical Transport, Uber or Lyft can ONLY be used if patient has a responsible adult to accompany them during ride home.       *Signs and symptoms of Infection Before or After Surgery:               !!!If you experience any fever, chills, nausea/ vomiting, foul odor/ excessive drainage from surgical site, flu-like symptoms, new wounds or cuts, PLEASE CALL THE SURGEON OFFICE at [phone] or SEND MESSAGE THROUGH [facility] PORTAL!!!     *If you are running late the morning of surgery, please call the Hospital Surgery Dept @ [phone].     *Billing questions:  [phone]     Thank you,  -[facility].  [phone] Velez   or [phone]  M-F 7:30 am-4:00 pm (Closed Major Holidays)

## 2024-08-21 NOTE — TELEPHONE ENCOUNTER
.Outgoing call placed to patient, patient verified name and date of birth, notified patient that she has some pre-op testing that needs to be completed before her procedure on tomorrow, she verbalized understanding and stated she needed to make a call first and will contact this nurse back to let me know what she will do. This nurse verbalized understanding.      ----- Message from Marian Pepper MD sent at 8/20/2024  4:43 PM CDT -----  Pt needs to be scheduled for ESWL at 7am on this Thursday, 8/22/24. Please have the OR adjust the times and schedule with [facility]

## 2024-08-21 NOTE — TELEPHONE ENCOUNTER
Spoke with Noah from [facility] to notify him of the time change for this patient's surgery time. He was notified that the time change was from 7 am to 12:30 pm. He verbalized understanding, got with his team and confirmed the time change with the same confirmation number. MD notified of confirmation.

## 2024-08-22 ENCOUNTER — ANESTHESIA EVENT (OUTPATIENT)
Dept: SURGERY | Facility: HOSPITAL | Age: 54
End: 2024-08-22
Payer: MEDICARE

## 2024-08-22 ENCOUNTER — ANESTHESIA (OUTPATIENT)
Dept: SURGERY | Facility: HOSPITAL | Age: 54
End: 2024-08-22
Payer: MEDICARE

## 2024-08-22 ENCOUNTER — HOSPITAL ENCOUNTER (OUTPATIENT)
Facility: HOSPITAL | Age: 54
Discharge: HOME | End: 2024-08-22
Attending: UROLOGY | Admitting: UROLOGY
Payer: MEDICARE

## 2024-08-22 VITALS
BODY MASS INDEX: 25.55 KG/M2 | OXYGEN SATURATION: 99 % | TEMPERATURE: 98 F | DIASTOLIC BLOOD PRESSURE: 72 MMHG | HEART RATE: 73 BPM | RESPIRATION RATE: 16 BRPM | WEIGHT: 162.81 LBS | HEIGHT: 67 IN | SYSTOLIC BLOOD PRESSURE: 124 MMHG

## 2024-08-22 DIAGNOSIS — N20.0 RIGHT RENAL STONE: Primary | ICD-10-CM

## 2024-08-22 PROCEDURE — 63600175 PHARM REV CODE 636 W HCPCS: Performed by: NURSE ANESTHETIST, CERTIFIED REGISTERED

## 2024-08-22 PROCEDURE — 50590 FRAGMENTING OF KIDNEY STONE: CPT | Mod: RT,,, | Performed by: UROLOGY

## 2024-08-22 PROCEDURE — 71000015 HC POSTOP RECOV 1ST HR: Performed by: UROLOGY

## 2024-08-22 PROCEDURE — 36000704 HC OR TIME LEV I 1ST 15 MIN: Performed by: UROLOGY

## 2024-08-22 PROCEDURE — 71000033 HC RECOVERY, INTIAL HOUR: Performed by: UROLOGY

## 2024-08-22 PROCEDURE — 25000003 PHARM REV CODE 250: Performed by: NURSE ANESTHETIST, CERTIFIED REGISTERED

## 2024-08-22 PROCEDURE — 63600175 PHARM REV CODE 636 W HCPCS: Performed by: UROLOGY

## 2024-08-22 PROCEDURE — 37000009 HC ANESTHESIA EA ADD 15 MINS: Performed by: UROLOGY

## 2024-08-22 PROCEDURE — 63600175 PHARM REV CODE 636 W HCPCS: Performed by: ANESTHESIOLOGY

## 2024-08-22 PROCEDURE — 37000008 HC ANESTHESIA 1ST 15 MINUTES: Performed by: UROLOGY

## 2024-08-22 PROCEDURE — 36000705 HC OR TIME LEV I EA ADD 15 MIN: Performed by: UROLOGY

## 2024-08-22 RX ORDER — ONDANSETRON HYDROCHLORIDE 2 MG/ML
4 INJECTION, SOLUTION INTRAVENOUS ONCE AS NEEDED
Status: DISCONTINUED | OUTPATIENT
Start: 2024-08-22 | End: 2024-08-22 | Stop reason: HOSPADM

## 2024-08-22 RX ORDER — PROPOFOL 10 MG/ML
VIAL (ML) INTRAVENOUS CONTINUOUS PRN
Status: DISCONTINUED | OUTPATIENT
Start: 2024-08-22 | End: 2024-08-22

## 2024-08-22 RX ORDER — OXYCODONE AND ACETAMINOPHEN 10; 325 MG/1; MG/1
1 TABLET ORAL EVERY 8 HOURS PRN
Qty: 21 TABLET | Refills: 0 | Status: SHIPPED | OUTPATIENT
Start: 2024-08-22

## 2024-08-22 RX ORDER — FENTANYL CITRATE 50 UG/ML
INJECTION, SOLUTION INTRAMUSCULAR; INTRAVENOUS
Status: DISCONTINUED | OUTPATIENT
Start: 2024-08-22 | End: 2024-08-22

## 2024-08-22 RX ORDER — CIPROFLOXACIN 2 MG/ML
400 INJECTION, SOLUTION INTRAVENOUS
Status: COMPLETED | OUTPATIENT
Start: 2024-08-22 | End: 2024-08-22

## 2024-08-22 RX ORDER — MIDAZOLAM HYDROCHLORIDE 1 MG/ML
INJECTION INTRAMUSCULAR; INTRAVENOUS
Status: DISCONTINUED | OUTPATIENT
Start: 2024-08-22 | End: 2024-08-22

## 2024-08-22 RX ORDER — FENTANYL CITRATE 50 UG/ML
25 INJECTION, SOLUTION INTRAMUSCULAR; INTRAVENOUS EVERY 5 MIN PRN
Status: DISCONTINUED | OUTPATIENT
Start: 2024-08-22 | End: 2024-08-22 | Stop reason: HOSPADM

## 2024-08-22 RX ORDER — ONDANSETRON 4 MG/1
4 TABLET, ORALLY DISINTEGRATING ORAL EVERY 8 HOURS PRN
Qty: 20 TABLET | Refills: 0 | Status: SHIPPED | OUTPATIENT
Start: 2024-08-22

## 2024-08-22 RX ORDER — ONDANSETRON HYDROCHLORIDE 2 MG/ML
4 INJECTION, SOLUTION INTRAVENOUS DAILY PRN
Status: DISCONTINUED | OUTPATIENT
Start: 2024-08-22 | End: 2024-08-22 | Stop reason: HOSPADM

## 2024-08-22 RX ORDER — HYDROMORPHONE HYDROCHLORIDE 2 MG/ML
0.2 INJECTION, SOLUTION INTRAMUSCULAR; INTRAVENOUS; SUBCUTANEOUS EVERY 5 MIN PRN
Status: DISCONTINUED | OUTPATIENT
Start: 2024-08-22 | End: 2024-08-22 | Stop reason: HOSPADM

## 2024-08-22 RX ORDER — LIDOCAINE HYDROCHLORIDE 20 MG/ML
INJECTION INTRAVENOUS
Status: DISCONTINUED | OUTPATIENT
Start: 2024-08-22 | End: 2024-08-22

## 2024-08-22 RX ORDER — OXYCODONE AND ACETAMINOPHEN 5; 325 MG/1; MG/1
1 TABLET ORAL
Status: DISCONTINUED | OUTPATIENT
Start: 2024-08-22 | End: 2024-08-22 | Stop reason: HOSPADM

## 2024-08-22 RX ORDER — PROPOFOL 10 MG/ML
VIAL (ML) INTRAVENOUS
Status: DISCONTINUED | OUTPATIENT
Start: 2024-08-22 | End: 2024-08-22

## 2024-08-22 RX ORDER — TAMSULOSIN HYDROCHLORIDE 0.4 MG/1
0.4 CAPSULE ORAL DAILY
Qty: 30 CAPSULE | Refills: 0 | Status: SHIPPED | OUTPATIENT
Start: 2024-08-22 | End: 2025-08-22

## 2024-08-22 RX ORDER — KETOROLAC TROMETHAMINE 30 MG/ML
INJECTION, SOLUTION INTRAMUSCULAR; INTRAVENOUS
Status: DISCONTINUED | OUTPATIENT
Start: 2024-08-22 | End: 2024-08-22

## 2024-08-22 RX ORDER — MEPERIDINE HYDROCHLORIDE 25 MG/ML
12.5 INJECTION INTRAMUSCULAR; INTRAVENOUS; SUBCUTANEOUS ONCE AS NEEDED
Status: COMPLETED | OUTPATIENT
Start: 2024-08-22 | End: 2024-08-22

## 2024-08-22 RX ORDER — ONDANSETRON HYDROCHLORIDE 2 MG/ML
INJECTION, SOLUTION INTRAVENOUS
Status: DISCONTINUED | OUTPATIENT
Start: 2024-08-22 | End: 2024-08-22

## 2024-08-22 RX ADMIN — FENTANYL CITRATE 50 MCG: 50 INJECTION, SOLUTION INTRAMUSCULAR; INTRAVENOUS at 03:08

## 2024-08-22 RX ADMIN — FENTANYL CITRATE 25 MCG: 50 INJECTION, SOLUTION INTRAMUSCULAR; INTRAVENOUS at 04:08

## 2024-08-22 RX ADMIN — SODIUM CHLORIDE, POTASSIUM CHLORIDE, SODIUM LACTATE AND CALCIUM CHLORIDE: 600; 310; 30; 20 INJECTION, SOLUTION INTRAVENOUS at 03:08

## 2024-08-22 RX ADMIN — LIDOCAINE HYDROCHLORIDE 50 MG: 20 INJECTION INTRAVENOUS at 03:08

## 2024-08-22 RX ADMIN — MIDAZOLAM HYDROCHLORIDE 2 MG: 1 INJECTION, SOLUTION INTRAMUSCULAR; INTRAVENOUS at 03:08

## 2024-08-22 RX ADMIN — KETOROLAC TROMETHAMINE 30 MG: 30 INJECTION, SOLUTION INTRAMUSCULAR; INTRAVENOUS at 04:08

## 2024-08-22 RX ADMIN — ONDANSETRON 4 MG: 2 INJECTION INTRAMUSCULAR; INTRAVENOUS at 03:08

## 2024-08-22 RX ADMIN — CIPROFLOXACIN 400 MG: 2 INJECTION, SOLUTION INTRAVENOUS at 03:08

## 2024-08-22 RX ADMIN — PROPOFOL 200 MG: 10 INJECTION, EMULSION INTRAVENOUS at 03:08

## 2024-08-22 RX ADMIN — MEPERIDINE HYDROCHLORIDE 12.5 MG: 25 INJECTION INTRAMUSCULAR; INTRAVENOUS; SUBCUTANEOUS at 04:08

## 2024-08-22 RX ADMIN — PROPOFOL 100 MCG/KG/MIN: 10 INJECTION, EMULSION INTRAVENOUS at 03:08

## 2024-08-22 NOTE — OP NOTE
Date of Procedure: 08/22/2024    PREOPERATIVE DIAGNOSIS:  Right renal stone.    POSTOPERATIVE DIAGNOSIS:  Same.    PROCEDURES:  Right extracorporeal shock wave lithotripsy.    SURGEON:  Marian Pepper M.D.    Assistants: None    Specimen: None    ANESTHESIA:  GETA.    FINDINGS:  good fragmentation    PROCEDURE IN DETAIL:  After proper consents were obtained, the patient brought to the Operating Room where the patient was placed under general anesthesia.  They were then appropriately positioned on the lithotripsy table.  After alignment of the system in the AP and oblique plance, the aforementioned stone treatment was performed consisting of 3000 shocks at a max power of 7kV, and a rate of 90.  Stone fragmentation appeared good; however, there may always be a need for further management if it does not pass.  The patient tolerated this well and there were no issues.  They were transferred to the PACU in stable condition.  I will have the patient return in 2 weeks with a KUB before the visit.    BLOOD LOSS:  None.    COMPLICATIONS:  None.

## 2024-08-22 NOTE — ANESTHESIA PREPROCEDURE EVALUATION
08/22/2024  Amanda Vega is a 53 y.o., female.    Patient Active Problem List   Diagnosis    Renal stones    Acute cystitis without hematuria    Microhematuria     Past Medical History:   Diagnosis Date    Abnormal Pap smear of cervix     cone 1993 anayeli 2    Fibromyalgia     Herpes simplex virus (HSV) infection     Hypertension      Past Surgical History:   Procedure Laterality Date    BACK SURGERY      CONIZATION OF CERVIX USING LOOP ELECTROSURGICAL EXCISION PROCEDURE (LEEP)  1993    anayeli 2    EXTRACORPOREAL SHOCK WAVE LITHOTRIPSY Left 6/5/2023    Procedure: LITHOTRIPSY, ESWL;  Surgeon: Marian Pepper MD;  Location: [facility];  Service: Urology;  Laterality: Left;    LITHOTRIPSY      NECK SURGERY       TTE (1/17/23):  FINDINGS:   Left Ventricle: Normal left ventricular cavity size. Normal left ventricular systolic   function. LVEF 55 - 65%. Normal diastolic function. Normal wall thickness.   Right Ventricle: Normal right ventricular size.   Left Atrium: The left atrium is normal in size.   Right Atrium: The right atrium is normal in size.   Mitral Valve: No or trivial mitral valve regurgitation. No mitral valve stenosis. Mild   mitral annular calcification.   Aortic Valve: The aortic valve has a tricuspid configuration. No or trivial aortic valve   regurgitation. No aortic valve stenosis. AV peak PG 8 mmHg AV Mean PG 4 mmHg   Tricuspid Valve: No or trivial tricuspid valve regurgitation. No tricuspid valve   stenosis. The estimated right ventricular systolic pressure/PASP is <35 mmHg.   Pulmonic Valve: No or trivial pulmonic valve regurgitation. No pulmonic valve stenosis.   Pericardium: Normal pericardium without evidence of pericardial effusion.   Aorta: Visualized portions of the aortic root and proximal aorta appear normal in size   and contour.   IVC: Normal IVC size with >50% collapse with  inspiration. Normal estimated RAP around 0-5   mmHg.     Pre-op Assessment    I have reviewed the Patient Summary Reports.     I have reviewed the Nursing Notes. I have reviewed the NPO Status.      Review of Systems  Anesthesia Hx:  No problems with previous Anesthesia   History of prior surgery of interest to airway management or planning:  Previous anesthesia: General         Personal Hx of Anesthesia complications, Post-Operative Nausea/Vomiting, in the past, but not with recent anesthetics / prophylaxis                    Social:  Non-Smoker       Hematology/Oncology:  Hematology Normal   Oncology Normal                                   Cardiovascular:     Hypertension              ECG has been reviewed. Normal sinus rhythm   Incomplete right bundle branch block   Abnormal R wave progression in the precordial leads   No previous ECGs available   Confirmed by DOUG PINA MD ([id number]) on 5/29/2023 12:21:30 PM                            Renal/:   renal calculi               Hepatic/GI:  Hepatic/GI Normal                 Neurological:    Neuromuscular Disease,       Fibromyalgia - tales amitriptyline daily                              Endocrine:  Endocrine Normal    BMI 26      Denies Obesity / BMI > 30  Dermatological:  Skin Normal    Psych:  Psychiatric Normal                    Physical Exam  General: Well nourished, Cooperative, Alert and Oriented    Airway:  Mallampati: III / II  Mouth Opening: Small, but > 3cm  TM Distance: Normal  Tongue: Normal  Neck ROM: Normal ROM    Dental:  Intact  Patient denies any currently loose or chipped teeth; Patient denies any removable dental appliances        Chemistry        Component Value Date/Time     01/05/2024 1029     05/29/2023 0924    K 4.4 01/05/2024 1029    K 4.0 05/29/2023 0924     01/05/2024 1029     05/29/2023 0924    CO2 28 01/05/2024 1029    CO2 28 05/29/2023 0924    BUN 18 01/05/2024 1029    BUN 23 (H) 05/29/2023 0924     CREATININE 0.84 01/05/2024 1029    CREATININE 0.9 05/29/2023 0924     (H) 05/29/2023 0924        Component Value Date/Time    CALCIUM 10.0 01/05/2024 1029    CALCIUM 9.9 05/29/2023 0924    ALKPHOS 102 05/29/2023 0924    AST 16 05/29/2023 0924    ALT 15 05/29/2023 0924    BILITOT 0.3 05/29/2023 0924    ESTGFRAFRICA 82 01/05/2024 1029        Lab Results   Component Value Date    WBC 8.98 05/29/2023    HGB 13.1 05/29/2023    HCT 39.7 05/29/2023    MCV 93 05/29/2023     05/29/2023         Anesthesia Plan  Type of Anesthesia, risks & benefits discussed:    Anesthesia Type: Gen ETT, Gen Supraglottic Airway  Intra-op Monitoring Plan: Standard ASA Monitors  Post Op Pain Control Plan: multimodal analgesia and IV/PO Opioids PRN  Induction:  IV  Airway Plan: Direct, Post-Induction  Informed Consent: Informed consent signed with the Patient and all parties understand the risks and agree with anesthesia plan.  All questions answered.   ASA Score: 2  Day of Surgery Review of History & Physical: H&P Update referred to the surgeon/provider.    Ready For Surgery From Anesthesia Perspective.     .

## 2024-08-22 NOTE — TRANSFER OF CARE
"Anesthesia Transfer of Care Note    Patient: Amanda Vega    Procedure(s) Performed: Procedure(s) (LRB):  LITHOTRIPSY, ESWL (Right)    Patient location: PACU    Anesthesia Type: general    Transport from OR: Transported from OR on room air with adequate spontaneous ventilation    Post pain: adequate analgesia    Post assessment: no apparent anesthetic complications    Post vital signs: stable    Level of consciousness: sedated    Nausea/Vomiting: no nausea/vomiting    Complications: none    Transfer of care protocol was followed      Last vitals: Visit Vitals  /65 (BP Location: Left arm, Patient Position: Sitting)   Pulse 72   Temp 37.1 °C (98.8 °F) (Temporal)   Resp 20   Ht 5' 7" (1.702 m)   Wt 73.9 kg (162 lb 13 oz)   LMP  (LMP Unknown)   SpO2 100%   Breastfeeding No   BMI 25.50 kg/m²     "

## 2024-08-22 NOTE — INTERVAL H&P NOTE
Stay hydrated    BP readings better and HR too    Try to work out, walking  Take time when going from sitting to standing   The patient has been examined and the H&P has been reviewed:    I concur with the findings and no changes have occurred since H&P was written.    Surgery risks, benefits and alternative options discussed and understood by patient/family.          There are no hospital problems to display for this patient.

## 2024-08-22 NOTE — ANESTHESIA PROCEDURE NOTES
Intubation    Date/Time: 8/22/2024 3:38 PM    Performed by: Beka Dixon CRNA  Authorized by: Germán Larsen MD    Intubation:     Induction:  Intravenous    Mask Ventilation:  Easy mask    Attempts:  1    Attempted By:  CRNA    Difficult Airway Encountered?: No      Complications:  None    Airway Device:  Supraglottic airway/LMA    Airway Device Size:  4.0    Style/Cuff Inflation:  Uncuffed    Secured at:  The lips    Placement Verified By:  Capnometry    Complicating Factors:  None

## 2024-08-22 NOTE — DISCHARGE SUMMARY
[facility] (Hospital)  Discharge Note  Short Stay    Procedure(s) (LRB):  LITHOTRIPSY, ESWL (Right)      OUTCOME: Patient tolerated treatment/procedure well without complication and is now ready for discharge.    DISPOSITION: Home or Self Care    FINAL DIAGNOSIS:  Right renal stone    FOLLOWUP: In clinic    DISCHARGE INSTRUCTIONS:    Discharge Procedure Orders   X-Ray Abdomen AP 1 View   Standing Status: Future Standing Exp. Date: 08/22/25     Order Specific Question Answer Comments   May the Radiologist modify the order per protocol to meet the clinical needs of the patient? Yes    Release to patient Immediate      Diet Adult Regular     Notify your health care provider if you experience any of the following:  temperature >100.4     Notify your health care provider if you experience any of the following:  persistent nausea and vomiting or diarrhea     Notify your health care provider if you experience any of the following:  severe uncontrolled pain     No dressing needed     Activity as tolerated        TIME SPENT ON DISCHARGE: 10 minutes

## 2024-08-23 ENCOUNTER — TELEPHONE (OUTPATIENT)
Dept: UROLOGY | Facility: CLINIC | Age: 54
End: 2024-08-23
Payer: MEDICARE

## 2024-08-23 NOTE — ANESTHESIA POSTPROCEDURE EVALUATION
Anesthesia Post Evaluation    Patient: Amanda Vega    Procedure(s) Performed: Procedure(s) (LRB):  LITHOTRIPSY, ESWL (Right)    Final Anesthesia Type: general      Patient location during evaluation: PACU  Patient participation: Yes- Able to Participate  Level of consciousness: awake and alert and oriented  Post-procedure vital signs: reviewed and stable  Pain management: adequate  Airway patency: patent  SPENSER mitigation strategies: Multimodal analgesia  PONV status at discharge: No PONV  Anesthetic complications: no      Cardiovascular status: blood pressure returned to baseline and hemodynamically stable  Respiratory status: unassisted  Hydration status: euvolemic  Follow-up not needed.              Vitals Value Taken Time   /72 08/22/24 1700   Temp 36.6 °C (97.8 °F) 08/22/24 1630   Pulse 73 08/22/24 1700   Resp 16 08/22/24 1700   SpO2 99 % 08/22/24 1700         Event Time   Out of Recovery 17:00:56         Pain/Greg Score: Pain Rating Prior to Med Admin: 6 (8/22/2024  4:50 PM)  Greg Score: 10 (8/22/2024  5:03 PM)

## 2024-08-23 NOTE — TELEPHONE ENCOUNTER
.Outgoing call placed to patient, patient verified name and date of birth, asked patient how she was feeling on today, she stated she is doing better this morning but she in more pain after the procedure, patient notified that Dr. Pepper would like to see her in about 2 weeks with a KUB before her visit, she verbalized understanding but stated that she needed to call us back after checking her calendar before she chooses a date. This nurse verbalized understanding and no further assistance needed at this time.       ----- Message from Marian Pepper MD sent at 8/22/2024  4:18 PM CDT -----  Please schedule post-op with KUB in 2 weeks

## 2024-12-13 ENCOUNTER — TELEPHONE (OUTPATIENT)
Dept: UROLOGY | Facility: CLINIC | Age: 54
End: 2024-12-13
Payer: MEDICARE

## 2024-12-13 NOTE — TELEPHONE ENCOUNTER
Left VM letting the pt know that her appt will need to be rescheduled because Dr Montaño will be in a meeting . Pt offered to come earlier if she can make it. Advised pt to give us a call to r/s appt

## 2025-01-07 ENCOUNTER — PATIENT MESSAGE (OUTPATIENT)
Dept: UROLOGY | Facility: CLINIC | Age: 55
End: 2025-01-07
Payer: MEDICARE

## 2025-01-07 ENCOUNTER — TELEPHONE (OUTPATIENT)
Dept: UROLOGY | Facility: CLINIC | Age: 55
End: 2025-01-07
Payer: MEDICARE

## 2025-01-07 NOTE — TELEPHONE ENCOUNTER
Appointment scheduled, patient notified.    ----- Message from [employer] sent at 1/7/2025  4:28 PM CST -----  Contact: Amanda  Type:  Sooner Apoointment Request    Caller is requesting a sooner appointment.  Caller declined first available appointment listed below.  Caller will not accept being placed on the waitlist and is requesting a message be sent to doctor.  Name of Caller:Amanda  When is the first available appointment? 04/28/2025  Symptoms:Right side kidney stone, in pain  Would the patient rather a call back or a response via [facility]? Call back  Best Call Back Number:[phone]   Additional Information: Patient in pain and need to be seen as soon as possible.

## 2025-01-22 ENCOUNTER — TELEPHONE (OUTPATIENT)
Dept: UROLOGY | Facility: CLINIC | Age: 55
End: 2025-01-22
Payer: MEDICARE

## 2025-01-22 NOTE — TELEPHONE ENCOUNTER
Tried calling pt about appt being rescheduled due o clinic being closed because of the weather, there was no answer. LEFT VM

## 2025-02-03 ENCOUNTER — OFFICE VISIT (OUTPATIENT)
Dept: UROLOGY | Facility: CLINIC | Age: 55
End: 2025-02-03
Payer: MEDICARE

## 2025-02-03 ENCOUNTER — HOSPITAL ENCOUNTER (OUTPATIENT)
Dept: RADIOLOGY | Facility: HOSPITAL | Age: 55
Discharge: HOME | End: 2025-02-03
Attending: UROLOGY
Payer: MEDICARE

## 2025-02-03 VITALS
HEART RATE: 86 BPM | SYSTOLIC BLOOD PRESSURE: 132 MMHG | HEIGHT: 67 IN | RESPIRATION RATE: 18 BRPM | BODY MASS INDEX: 25.96 KG/M2 | WEIGHT: 165.38 LBS | DIASTOLIC BLOOD PRESSURE: 80 MMHG

## 2025-02-03 DIAGNOSIS — N20.0 RENAL STONE: Primary | ICD-10-CM

## 2025-02-03 DIAGNOSIS — N22 CALCULUS OF URINARY TRACT IN DISEASES CLASSIFIED ELSEWHERE: ICD-10-CM

## 2025-02-03 PROCEDURE — 99999 PR PBB SHADOW E&M-EST. PATIENT-LVL III: CPT | Mod: PBBFAC,,, | Performed by: UROLOGY

## 2025-02-03 PROCEDURE — 99213 OFFICE O/P EST LOW 20 MIN: CPT | Mod: PBBFAC,25 | Performed by: UROLOGY

## 2025-02-03 PROCEDURE — 99214 OFFICE O/P EST MOD 30 MIN: CPT | Mod: S$PBB,,, | Performed by: UROLOGY

## 2025-02-03 PROCEDURE — 74176 CT ABD & PELVIS W/O CONTRAST: CPT | Mod: TC

## 2025-02-03 PROCEDURE — 87086 URINE CULTURE/COLONY COUNT: CPT | Performed by: UROLOGY

## 2025-02-03 PROCEDURE — 74176 CT ABD & PELVIS W/O CONTRAST: CPT | Mod: 26,,, | Performed by: RADIOLOGY

## 2025-02-03 RX ORDER — CYCLOBENZAPRINE HCL 10 MG
10 TABLET ORAL 3 TIMES DAILY PRN
COMMUNITY
Start: 2024-10-27

## 2025-02-03 NOTE — H&P (VIEW-ONLY)
CC: follow up kidney stones    History of Present Illness:   Amanda Vega is a 54 y.o. female here for follow up    2/3/25-she reports right sided flank pain, which has been on and of for several months. Also reports neck pain. She has been to the ED several times, last time was 1-2 weeks ago. She was told that she had stones but no ureteral stones. No dysuria or gross hematuria. No abdominal pain. States that it feels just like a stone.     8/20/24-CT scan dated 8/16/24 personally reviewed and shows a 1.3cm R renal pelvic stone. She is having stabbing pain from her neck to lower back. Seems to be worse at night. Having difficulty going up her stairs. No nausea/vomiting yet. No fever.     7/29/24-Pt recently had spinal stenosis surgery a little over a month ago. She has been having some right sided stone pain lately. No gross hematuria. She was recently treated with cephalexin following her surgery. No current dysuria or bladder issues. Takes vesicare when her frequency increases.     5/29/23- Pt reports that she has been having some LLQ pain and sharp pain in her bottom. She also reports some right flank pain. No fever. CT scan was personally reviewed, showing a 10mm L renal pelvis stone and bilateral non-obstructing renal stones. HU measure <1000.   5/1/23-She has been treated for 3 UTIs over the past 2 months. Main symptoms were pain in her body and she just thought it was her fibromyalgia. No gross hematuria. No dysuria. She does have somewhat of a sharp pain in her vagina. She was treated with amoxicillin 3 times. No urinary frequency, but she does have some vaginal itching and foul odor. Slight sharp flank pain on the right, but it went away. No recent stone passage.   7/23/21-She states that she had a UTI recently. Was placed on Macrobid. No dysuria currently. No recent kidney stone passage. Drinking more water. No gross hematuria. +frequency, but not bothersome.   Father recently passed from covid.        Review of Systems   Constitutional:  Negative for chills and fever.   Respiratory:  Negative for shortness of breath.    Cardiovascular:  Negative for chest pain.   Gastrointestinal:  Negative for abdominal pain.   Genitourinary:  Negative for hematuria.   Musculoskeletal:  Positive for arthralgias.   Psychiatric/Behavioral:  Negative for confusion.    All other systems reviewed and are negative.      Current Outpatient Medications   Medication Sig Dispense Refill    amitriptyline (ELAVIL) 100 MG tablet Take 1 tablet by mouth every evening.      cyclobenzaprine (FLEXERIL) 10 MG tablet Take 10 mg by mouth 3 (three) times daily as needed for Muscle spasms.      iron/FA/dha/epa/FAD/NADH/mv47 (ENLYTE ORAL) Take by mouth.      multivitamin (ONE DAILY MULTIVITAMIN) per tablet Take 1 tablet by mouth every evening.      propranoloL (INDERAL LA) 160 mg 24 hr capsule Take 1 capsule by mouth every evening.      ondansetron (ZOFRAN-ODT) 4 MG TbDL Dissolve 1 tablet (4 mg total) by mouth every 8 (eight) hours as needed (nausea). (Patient not taking: Reported on 2/3/2025) 20 tablet 0    oxyCODONE-acetaminophen (PERCOCET)  mg per tablet Take 1 tablet by mouth every 8 (eight) hours as needed for Pain. (Patient not taking: Reported on 2/3/2025) 21 tablet 0    solifenacin (VESICARE) 5 MG tablet Take 1 tablet (5 mg total) by mouth once daily. (Patient taking differently: Take 5 mg by mouth daily as needed.) 30 tablet 11    tamsulosin (FLOMAX) 0.4 mg Cap Take 1 capsule (0.4 mg total) by mouth once daily. (Patient not taking: Reported on 2/3/2025) 30 capsule 0    vortioxetine (TRINTELLIX) 10 mg Tab Take 10 mg by mouth Daily. (Patient not taking: Reported on 2/3/2025)       No current facility-administered medications for this visit.       Review of patient's allergies indicates:   Allergen Reactions    Duloxetine      DIZZINESS       Past medical, family, and social history reviewed as documented in chart with pertinent  positive medical, family, and social history detailed in HPI.    Physical Exam  Vitals:    02/03/25 1110   BP: 132/80   Pulse: 86   Resp: 18         General: Well-developed, Well Nourished in No acute distress  HEENT: Normocephalic, Atraumatic, Extraocular Movements intact  Neck: Supple, trachea midline, no cervical or supraclavicular lymphadenopathy  Respirations: Even and unlabored  Extremities: Moves all equally, atraumatic, no clubbing, cyanosis or edema  Skin: warm and dry  Psych: normal affect  Neuro: Alert and oriented x 3, Cranial nerves II-XII grossly intact    UA: large blood, nit and LE negative    CT renal stone protocol personally reviewed, showing no ureteral stones or hydronephrosis. Right 6mm lower pole stone present. Several small L sided stones up to 4mm.     Assesment:   1. Renal stone        2. Calculus of urinary tract in diseases classified elsewhere  CT Renal Stone Study ABD Pelvis WO    Urine Culture High Risk                    Plan:  Renal stone    Calculus of urinary tract in diseases classified elsewhere  -     CT Renal Stone Study ABD Pelvis WO; Future; Expected date: 02/03/2025  -     Urine Culture High Risk    I called the pt and discussed the CT in detail. Discussed that I don't think the right renal stone is causing her pain, but it is present. She would like to treat it with ESWL and feels confident that it is the cause. Scheduled 2/13/25.        I spent a total of 36 minutes on the day of the visit.  This includes face to face time and non-face to face time preparing to see the patient (eg, review of tests), obtaining and/or reviewing separately obtained history, documenting clinical information in the electronic or other health record, independently interpreting results and communicating results to the patient/family/caregiver, or care coordinator.

## 2025-02-05 DIAGNOSIS — N20.0 RIGHT RENAL STONE: ICD-10-CM

## 2025-02-05 DIAGNOSIS — N20.0 RENAL STONE: Primary | ICD-10-CM

## 2025-02-05 LAB
BACTERIA UR CULT: NORMAL
BACTERIA UR CULT: NORMAL

## 2025-02-05 RX ORDER — CIPROFLOXACIN 2 MG/ML
400 INJECTION, SOLUTION INTRAVENOUS
Status: CANCELLED | OUTPATIENT
Start: 2025-02-05

## 2025-02-06 ENCOUNTER — PATIENT MESSAGE (OUTPATIENT)
Dept: PREADMISSION TESTING | Facility: HOSPITAL | Age: 55
End: 2025-02-06
Payer: MEDICARE

## 2025-02-07 ENCOUNTER — PATIENT MESSAGE (OUTPATIENT)
Dept: PREADMISSION TESTING | Facility: HOSPITAL | Age: 55
End: 2025-02-07
Payer: MEDICARE

## 2025-02-07 ENCOUNTER — HOSPITAL ENCOUNTER (OUTPATIENT)
Dept: CARDIOLOGY | Facility: HOSPITAL | Age: 55
Discharge: HOME | End: 2025-02-07
Attending: UROLOGY
Payer: MEDICARE

## 2025-02-07 ENCOUNTER — TELEPHONE (OUTPATIENT)
Dept: PREADMISSION TESTING | Facility: HOSPITAL | Age: 55
End: 2025-02-07
Payer: MEDICARE

## 2025-02-07 DIAGNOSIS — Z01.818 PRE-OP TESTING: ICD-10-CM

## 2025-02-07 LAB
OHS QRS DURATION: 78 MS
OHS QTC CALCULATION: 403 MS

## 2025-02-07 PROCEDURE — 93010 ELECTROCARDIOGRAM REPORT: CPT | Mod: ,,, | Performed by: INTERNAL MEDICINE

## 2025-02-07 PROCEDURE — 93005 ELECTROCARDIOGRAM TRACING: CPT

## 2025-02-07 NOTE — TELEPHONE ENCOUNTER
Pre op instructions reviewed with Pt over telephone, verbalized understanding.    Procedure Date: 2/13/25  Arrival Time:  TBD; We will call you after 2pm the day before your procedure with your arrival time.    Address:   [facility] (Off [facility], 2nd Building on the left)  [id number] [place] [address] [place] [address]  >>>Please enter through front entrance Lobby of 1st floor to Registration desk<<<        !!!INSTRUCTIONS IMPORTANT!!!  NO FOOD or tobacco products after midnight the night before surgery! You may have clear liquids up to 3 hrs before your arrival to the Hospital  Clear liquids include Gatorade, water, soda, black coffee or tea (no milk or creamer), and clear juices.  Clear liquids do NOT include anything with pulp or food particles (Chicken broth, ice cream, yogurt, Jello, etc.)    >>>MEDICATION INSTRUCTIONS<<<: Morning of Surgery, take small sip of water with ONLY these medications:  none        *ADHD Medication: Stop taking ADHD/ADD medications 48 hrs prior to surgery, as this can affect the anesthesia used. Bariatric surgeries must HOLD 7 Days prior to surgery!    *Diabetic/ Prediabetic Patients: !!!If you take diabetic or weight loss medication, Do NOT take morning of surgery unless instructed by Doctor!!!  Metformin to be stopped 24 hrs prior to surgery.   Long Acting Insulin Instructions: HOLD the night before surgery unless instructed differently by Provider!  Ozempic/ Mounjaro/ Wegovy/ Trulicity/ Semaglutide injections or weight loss medication to be stopped 7 days prior to surgery.    !!!STOP ALL Aspirins, NSAIDS, WEIGHT LOSS INJECTIONS/PILLS, Herbal supplements, & Vitamins 7 DAYS BEFORE SURGERY!!!    ____  Avoid Alcoholic beverages 3 days prior to surgery, as it can thin the blood.  ____  NO Acrylic/fake nails or nail polish worn day of surgery (specifically hand/arm & foot surgeries).  ____  NO powder, lotions, deodorants, oils or cream on body.  ____  Remove all jewelry &  piercings & foreign objects before arrival & leave at home.  ____  Remove Dentures, Hearing Aids & Contact Lens prior to surgery.  ____  Bring photo ID and insurance information to hospital (Leave Valuables at Home).  ____  If going home the same day, arrange for a ride home. You will not be able to drive for 24 hrs if Anesthesia was used.   ____  Females (ages 11-60): may need to give a urine sample the morning of surgery; please see Pre op Nurse prior to using the restroom.  ____  Males: Stop ED medications (Viagra, Cialis) 24 hrs prior to surgery.  ____  Wear clean, loose fitting clothing to allow for dressings/ bandages.      Bathing Instructions:    -Shower with anti-bacterial Soap (Hibiclens or Dial) the night before surgery and the morning of.   -Do not use Hibiclens on your face or genitals.   -Apply clean clothes after shower.  -Do not shave your face or body 2 days prior to surgery unless instructed otherwise by your Surgeon.  -Do not shave pubic hair 7 days prior to surgery (gyn pt's).    [facility] Visitor/Ride Policy:  Only 2 adults allowed in pre op/recovery area during your procedure. You MUST HAVE A RIDE HOME from a responsible adult that you know and trust. Medical Transport, Uber or Lyft can ONLY be used if patient has a responsible adult to accompany them during ride home.       *Signs and symptoms of Infection Before or After Surgery:               !!!If you experience any fever, chills, nausea/ vomiting, foul odor/ excessive drainage from surgical site, flu-like symptoms, new wounds or cuts, PLEASE CALL THE SURGEON OFFICE at [phone] or SEND MESSAGE THROUGH [employer] PORTAL!!!     *If you are running late the morning of surgery, please call the Hospital Surgery Dept @ [phone].     *Billing questions:  [phone]     Thank you,  -[facility] Pre Admit Dept.  [phone] or [phone]  M-F 7:30 am-4:00 pm (Closed Major Holidays)

## 2025-02-11 ENCOUNTER — LAB VISIT (OUTPATIENT)
Dept: LAB | Facility: HOSPITAL | Age: 55
End: 2025-02-11
Attending: UROLOGY
Payer: MEDICARE

## 2025-02-11 DIAGNOSIS — Z01.818 PRE-OP TESTING: ICD-10-CM

## 2025-02-11 LAB
ALBUMIN SERPL BCP-MCNC: 3.7 G/DL (ref 3.5–5.2)
ALP SERPL-CCNC: 121 U/L (ref 40–150)
ALT SERPL W/O P-5'-P-CCNC: 18 U/L (ref 10–44)
ANION GAP SERPL CALC-SCNC: 9 MMOL/L (ref 8–16)
AST SERPL-CCNC: 16 U/L (ref 10–40)
BASOPHILS # BLD AUTO: 0.03 K/UL (ref 0–0.2)
BASOPHILS NFR BLD: 0.4 % (ref 0–1.9)
BILIRUB SERPL-MCNC: 0.3 MG/DL (ref 0.1–1)
BUN SERPL-MCNC: 19 MG/DL (ref 6–20)
CALCIUM SERPL-MCNC: 9.5 MG/DL (ref 8.7–10.5)
CHLORIDE SERPL-SCNC: 104 MMOL/L (ref 95–110)
CO2 SERPL-SCNC: 24 MMOL/L (ref 23–29)
CREAT SERPL-MCNC: 0.8 MG/DL (ref 0.5–1.4)
DIFFERENTIAL METHOD BLD: ABNORMAL
EOSINOPHIL # BLD AUTO: 0.2 K/UL (ref 0–0.5)
EOSINOPHIL NFR BLD: 2.7 % (ref 0–8)
ERYTHROCYTE [DISTWIDTH] IN BLOOD BY AUTOMATED COUNT: 12.7 % (ref 11.5–14.5)
EST. GFR  (NO RACE VARIABLE): >60 ML/MIN/1.73 M^2
GLUCOSE SERPL-MCNC: 159 MG/DL (ref 70–110)
HCT VFR BLD AUTO: 43 % (ref 37–48.5)
HGB BLD-MCNC: 13.5 G/DL (ref 12–16)
IMM GRANULOCYTES # BLD AUTO: 0.02 K/UL (ref 0–0.04)
IMM GRANULOCYTES NFR BLD AUTO: 0.3 % (ref 0–0.5)
LYMPHOCYTES # BLD AUTO: 2.2 K/UL (ref 1–4.8)
LYMPHOCYTES NFR BLD: 31 % (ref 18–48)
MCH RBC QN AUTO: 29.9 PG (ref 27–31)
MCHC RBC AUTO-ENTMCNC: 31.4 G/DL (ref 32–36)
MCV RBC AUTO: 95 FL (ref 82–98)
MONOCYTES # BLD AUTO: 0.4 K/UL (ref 0.3–1)
MONOCYTES NFR BLD: 5.4 % (ref 4–15)
NEUTROPHILS # BLD AUTO: 4.2 K/UL (ref 1.8–7.7)
NEUTROPHILS NFR BLD: 60.2 % (ref 38–73)
NRBC BLD-RTO: 0 /100 WBC
PLATELET # BLD AUTO: 339 K/UL (ref 150–450)
PMV BLD AUTO: 8.9 FL (ref 9.2–12.9)
POTASSIUM SERPL-SCNC: 4.4 MMOL/L (ref 3.5–5.1)
PROT SERPL-MCNC: 7.5 G/DL (ref 6–8.4)
RBC # BLD AUTO: 4.52 M/UL (ref 4–5.4)
SODIUM SERPL-SCNC: 137 MMOL/L (ref 136–145)
WBC # BLD AUTO: 7.01 K/UL (ref 3.9–12.7)

## 2025-02-11 PROCEDURE — 36415 COLL VENOUS BLD VENIPUNCTURE: CPT | Performed by: UROLOGY

## 2025-02-11 PROCEDURE — 85025 COMPLETE CBC W/AUTO DIFF WBC: CPT | Performed by: UROLOGY

## 2025-02-11 PROCEDURE — 80053 COMPREHEN METABOLIC PANEL: CPT | Performed by: UROLOGY

## 2025-02-12 ENCOUNTER — TELEPHONE (OUTPATIENT)
Dept: UROLOGY | Facility: CLINIC | Age: 55
End: 2025-02-12
Payer: MEDICARE

## 2025-02-12 ENCOUNTER — TELEPHONE (OUTPATIENT)
Dept: SURGERY | Facility: CLINIC | Age: 55
End: 2025-02-12
Payer: MEDICARE

## 2025-02-12 NOTE — TELEPHONE ENCOUNTER
Called and spoke with patient about the following:     Please arrive to [facility] ([facility]) at 6:30am on 2/13/25 for your scheduled procedure.  Address: [address] [place] [address] [place] [address] (2nd Building on left, 1st Floor Lobby)    !!!NO FOOD after midnight! You may have clear liquids up to 3 hrs before your arrival to the Hospital!!!  Clear liquids include Gatorade, water, soda, black coffee or tea (no milk or creamer), and clear juices.  Clear liquids do NOT include anything with pulp or food particles (Chicken broth, ice cream, yogurt, Jello, etc.)

## 2025-02-12 NOTE — TELEPHONE ENCOUNTER
.Outgoing call placed to patient, patient verified name and date of birth, patient wanted to know what time her surgery was scheduled for and what time she should arrive, notified of scheduled time and that she needs to be there an hour and a half before to pre-op and that pre-admit will contact her as well, she verbalized understanding and stated she may be a few minutes late cause her boyfriend is bringing her and has to work in the morning. Encouraged to speak with pre-admit regarding her timing when they contact her.      ----- Message from Ilene sent at 2/12/2025 12:24 PM CST -----  Contact: Patient, [phone]  [email or web address]     Patient is calling for Medical Advice regarding: Calling to speak with the nurse regarding her procedure tomorrow.    How long has patient had these symptoms: N/A    Pharmacy name and phone#: N/A    Patient wants a call back or thru [facility]: Call back    Comments: Please call her. Thanks.    Please advise patient replies from provider may take up to 48 hours.

## 2025-02-13 ENCOUNTER — HOSPITAL ENCOUNTER (OUTPATIENT)
Facility: HOSPITAL | Age: 55
Discharge: HOME | End: 2025-02-13
Attending: UROLOGY | Admitting: UROLOGY
Payer: MEDICARE

## 2025-02-13 ENCOUNTER — ANESTHESIA (OUTPATIENT)
Dept: SURGERY | Facility: HOSPITAL | Age: 55
End: 2025-02-13
Payer: MEDICARE

## 2025-02-13 ENCOUNTER — ANESTHESIA EVENT (OUTPATIENT)
Dept: SURGERY | Facility: HOSPITAL | Age: 55
End: 2025-02-13
Payer: MEDICARE

## 2025-02-13 VITALS
OXYGEN SATURATION: 95 % | BODY MASS INDEX: 25.85 KG/M2 | HEIGHT: 67 IN | SYSTOLIC BLOOD PRESSURE: 125 MMHG | RESPIRATION RATE: 20 BRPM | WEIGHT: 164.69 LBS | HEART RATE: 78 BPM | DIASTOLIC BLOOD PRESSURE: 65 MMHG | TEMPERATURE: 99 F

## 2025-02-13 DIAGNOSIS — N20.0 RIGHT RENAL STONE: Primary | ICD-10-CM

## 2025-02-13 DIAGNOSIS — N20.0 RENAL STONE: ICD-10-CM

## 2025-02-13 PROCEDURE — 71000033 HC RECOVERY, INTIAL HOUR: Performed by: UROLOGY

## 2025-02-13 PROCEDURE — 25000003 PHARM REV CODE 250: Performed by: CLINIC/CENTER

## 2025-02-13 PROCEDURE — 37000009 HC ANESTHESIA EA ADD 15 MINS: Performed by: UROLOGY

## 2025-02-13 PROCEDURE — 71000015 HC POSTOP RECOV 1ST HR: Performed by: UROLOGY

## 2025-02-13 PROCEDURE — 37000008 HC ANESTHESIA 1ST 15 MINUTES: Performed by: UROLOGY

## 2025-02-13 PROCEDURE — 63600175 PHARM REV CODE 636 W HCPCS: Performed by: CLINIC/CENTER

## 2025-02-13 PROCEDURE — 36000704 HC OR TIME LEV I 1ST 15 MIN: Performed by: UROLOGY

## 2025-02-13 PROCEDURE — 36000705 HC OR TIME LEV I EA ADD 15 MIN: Performed by: UROLOGY

## 2025-02-13 PROCEDURE — 63600175 PHARM REV CODE 636 W HCPCS: Performed by: UROLOGY

## 2025-02-13 RX ORDER — DEXAMETHASONE SODIUM PHOSPHATE 4 MG/ML
INJECTION, SOLUTION INTRA-ARTICULAR; INTRALESIONAL; INTRAMUSCULAR; INTRAVENOUS; SOFT TISSUE
Status: DISCONTINUED | OUTPATIENT
Start: 2025-02-13 | End: 2025-02-13

## 2025-02-13 RX ORDER — TAMSULOSIN HYDROCHLORIDE 0.4 MG/1
0.4 CAPSULE ORAL DAILY
Qty: 10 CAPSULE | Refills: 0 | Status: SHIPPED | OUTPATIENT
Start: 2025-02-13 | End: 2026-02-13

## 2025-02-13 RX ORDER — CIPROFLOXACIN 2 MG/ML
400 INJECTION, SOLUTION INTRAVENOUS
Status: COMPLETED | OUTPATIENT
Start: 2025-02-13 | End: 2025-02-13

## 2025-02-13 RX ORDER — SUCCINYLCHOLINE CHLORIDE 20 MG/ML
INJECTION INTRAMUSCULAR; INTRAVENOUS
Status: DISCONTINUED | OUTPATIENT
Start: 2025-02-13 | End: 2025-02-13

## 2025-02-13 RX ORDER — ONDANSETRON HYDROCHLORIDE 2 MG/ML
4 INJECTION, SOLUTION INTRAVENOUS DAILY PRN
Status: DISCONTINUED | OUTPATIENT
Start: 2025-02-13 | End: 2025-02-13 | Stop reason: HOSPADM

## 2025-02-13 RX ORDER — PROPOFOL 10 MG/ML
VIAL (ML) INTRAVENOUS
Status: DISCONTINUED | OUTPATIENT
Start: 2025-02-13 | End: 2025-02-13

## 2025-02-13 RX ORDER — KETOROLAC TROMETHAMINE 30 MG/ML
15 INJECTION, SOLUTION INTRAMUSCULAR; INTRAVENOUS EVERY 8 HOURS PRN
Status: DISCONTINUED | OUTPATIENT
Start: 2025-02-13 | End: 2025-02-13 | Stop reason: HOSPADM

## 2025-02-13 RX ORDER — OXYCODONE AND ACETAMINOPHEN 5; 325 MG/1; MG/1
1 TABLET ORAL
Status: DISCONTINUED | OUTPATIENT
Start: 2025-02-13 | End: 2025-02-13 | Stop reason: HOSPADM

## 2025-02-13 RX ORDER — ROCURONIUM BROMIDE 10 MG/ML
INJECTION, SOLUTION INTRAVENOUS
Status: DISCONTINUED | OUTPATIENT
Start: 2025-02-13 | End: 2025-02-13

## 2025-02-13 RX ORDER — OXYCODONE AND ACETAMINOPHEN 10; 325 MG/1; MG/1
1 TABLET ORAL EVERY 8 HOURS PRN
Qty: 20 TABLET | Refills: 0 | Status: SHIPPED | OUTPATIENT
Start: 2025-02-13

## 2025-02-13 RX ORDER — FENTANYL CITRATE 50 UG/ML
INJECTION, SOLUTION INTRAMUSCULAR; INTRAVENOUS
Status: DISCONTINUED | OUTPATIENT
Start: 2025-02-13 | End: 2025-02-13

## 2025-02-13 RX ORDER — MIDAZOLAM HYDROCHLORIDE 1 MG/ML
INJECTION INTRAMUSCULAR; INTRAVENOUS
Status: DISCONTINUED | OUTPATIENT
Start: 2025-02-13 | End: 2025-02-13

## 2025-02-13 RX ORDER — HYDROMORPHONE HYDROCHLORIDE 2 MG/ML
0.2 INJECTION, SOLUTION INTRAMUSCULAR; INTRAVENOUS; SUBCUTANEOUS EVERY 5 MIN PRN
Status: DISCONTINUED | OUTPATIENT
Start: 2025-02-13 | End: 2025-02-13 | Stop reason: HOSPADM

## 2025-02-13 RX ORDER — ONDANSETRON HYDROCHLORIDE 2 MG/ML
INJECTION, SOLUTION INTRAVENOUS
Status: DISCONTINUED | OUTPATIENT
Start: 2025-02-13 | End: 2025-02-13

## 2025-02-13 RX ORDER — LIDOCAINE HYDROCHLORIDE 20 MG/ML
INJECTION INTRAVENOUS
Status: DISCONTINUED | OUTPATIENT
Start: 2025-02-13 | End: 2025-02-13

## 2025-02-13 RX ADMIN — LIDOCAINE HYDROCHLORIDE 100 MG: 20 INJECTION INTRAVENOUS at 08:02

## 2025-02-13 RX ADMIN — ROCURONIUM BROMIDE 5 MG: 10 INJECTION, SOLUTION INTRAVENOUS at 08:02

## 2025-02-13 RX ADMIN — SODIUM CHLORIDE, SODIUM LACTATE, POTASSIUM CHLORIDE, AND CALCIUM CHLORIDE: .6; .31; .03; .02 INJECTION, SOLUTION INTRAVENOUS at 08:02

## 2025-02-13 RX ADMIN — FENTANYL CITRATE 100 MCG: 50 INJECTION, SOLUTION INTRAMUSCULAR; INTRAVENOUS at 08:02

## 2025-02-13 RX ADMIN — FENTANYL CITRATE 25 MCG: 50 INJECTION, SOLUTION INTRAMUSCULAR; INTRAVENOUS at 09:02

## 2025-02-13 RX ADMIN — SUCCINYLCHOLINE CHLORIDE 80 MG: 20 INJECTION, SOLUTION INTRAMUSCULAR; INTRAVENOUS; PARENTERAL at 08:02

## 2025-02-13 RX ADMIN — CIPROFLOXACIN 400 MG: 2 INJECTION, SOLUTION INTRAVENOUS at 08:02

## 2025-02-13 RX ADMIN — PROPOFOL 150 MG: 10 INJECTION, EMULSION INTRAVENOUS at 08:02

## 2025-02-13 RX ADMIN — DEXAMETHASONE SODIUM PHOSPHATE 4 MG: 4 INJECTION, SOLUTION INTRA-ARTICULAR; INTRALESIONAL; INTRAMUSCULAR; INTRAVENOUS; SOFT TISSUE at 08:02

## 2025-02-13 RX ADMIN — ONDANSETRON 4 MG: 2 INJECTION INTRAMUSCULAR; INTRAVENOUS at 08:02

## 2025-02-13 RX ADMIN — MIDAZOLAM HYDROCHLORIDE 2 MG: 1 INJECTION, SOLUTION INTRAMUSCULAR; INTRAVENOUS at 08:02

## 2025-02-13 NOTE — TRANSFER OF CARE
"Anesthesia Transfer of Care Note    Patient: Amanda Vega    Procedure(s) Performed: Procedure(s) (LRB):  LITHOTRIPSY, ESWL (Right)    Anesthesia Type: general    Transport from OR: Transported from OR on room air with adequate spontaneous ventilation    Post pain: adequate analgesia    Post assessment: no apparent anesthetic complications and tolerated procedure well    Post vital signs: stable    Level of consciousness: sedated    Nausea/Vomiting: no nausea/vomiting    Complications: none    Transfer of care protocol was followed      Last vitals: Visit Vitals  /72 (BP Location: Right arm, Patient Position: Sitting)   Pulse 101   Temp 36.6 °C (97.9 °F) (Temporal)   Resp 18   Ht 5' 7" (1.702 m)   Wt 74.7 kg (164 lb 10.9 oz)   LMP  (LMP Unknown)   SpO2 98%   Breastfeeding No   BMI 25.79 kg/m²     "

## 2025-02-13 NOTE — DISCHARGE SUMMARY
[facility] (Hospital)  Discharge Note  Short Stay    Procedure(s) (LRB):  LITHOTRIPSY, ESWL (Right)      OUTCOME: Patient tolerated treatment/procedure well without complication and is now ready for discharge.    DISPOSITION: Home or Self Care    FINAL DIAGNOSIS:  Right renal stone    FOLLOWUP: In clinic    DISCHARGE INSTRUCTIONS:    Discharge Procedure Orders   X-Ray Abdomen AP 1 View   Standing Status: Future Standing Exp. Date: 02/13/26     Order Specific Question Answer Comments   May the Radiologist modify the order per protocol to meet the clinical needs of the patient? Yes    Release to patient Immediate      Diet Adult Regular     Notify your health care provider if you experience any of the following:  temperature >100.4     Notify your health care provider if you experience any of the following:  persistent nausea and vomiting or diarrhea     Notify your health care provider if you experience any of the following:  severe uncontrolled pain     No dressing needed     Activity as tolerated        TIME SPENT ON DISCHARGE: 10 minutes

## 2025-02-13 NOTE — ANESTHESIA PROCEDURE NOTES
"Occupational Therapy Treatment completed with focus on ADLs and ADL transfers.  Functional Status:  Supervision supine to sit.  Supervision LB dressing.  Pt walked hallway with HHA (needs help 2' to blindness).  Pt brushed teeth w/SBA.  Pt reports no difficulty with toileting/toilet transfers.  Pt will have support from mom at [place].  Pt left EOB w/PT at end of session.  Plan of Care: Patient with no further skilled OT needs in the acute care setting at this time  Discharge Recommendations:  Equipment No Equipment Needed. Currently anticipate no further skilled therapy needs once patient is discharged from the inpatient setting.    See \"Rehab Therapy-Acute\" Patient Summary Report for complete documentation.   " Intubation    Date/Time: 2/13/2025 8:29 AM    Performed by: Modesto Wellington III, CRNA  Authorized by: Khris Bell MD    Intubation:     Induction:  Intravenous    Intubated:  Postinduction    Mask Ventilation:  Not attempted    Attempts:  1    Attempted By:  CRNA    Method of Intubation:  Direct    Blade:  Lee 2    Laryngeal View Grade: Grade I - full view of cords      Difficult Airway Encountered?: No      Complications:  None    Airway Device:  Oral endotracheal tube    Airway Device Size:  7.0    Style/Cuff Inflation:  Cuffed (inflated to minimal occlusive pressure)    Tube secured:  20    Secured at:  The lips    Placement Verified By:  Capnometry    Complicating Factors:  None    Findings Post-Intubation:  BS equal bilateral and atraumatic/condition of teeth unchanged

## 2025-02-13 NOTE — OP NOTE
Date of Procedure: 02/13/2025    PREOPERATIVE DIAGNOSIS:  Right renal stone.    POSTOPERATIVE DIAGNOSIS:  Same.    PROCEDURES:  Right extracorporeal shock wave lithotripsy.    SURGEON:  Marian Pepper M.D.    Assistants: None    Specimen: None    ANESTHESIA:  GETA.    FINDINGS:  good fragmentation    PROCEDURE IN DETAIL:  After proper consents were obtained, the patient brought to the Operating Room where the patient was placed under general anesthesia.  They were then appropriately positioned on the lithotripsy table.  After alignment of the system in the AP and oblique plance, the aforementioned stone treatment was performed consisting of 2500 shocks at a max power of 8, and a rate of 90.  Stone fragmentation appeared good; however, there may always be a need for further management if it does not pass.  The patient tolerated this well and there were no issues.  They were transferred to the PACU in stable condition.  I will have the patient return in 2 weeks with a KUB before the visit.    BLOOD LOSS:  None.    COMPLICATIONS:  None.

## 2025-02-13 NOTE — ANESTHESIA PREPROCEDURE EVALUATION
02/13/2025  Amanda Vega is a 54 y.o., female.      Pre-op Assessment    I have reviewed the Patient Summary Reports.    I have reviewed the NPO Status.   I have reviewed the Medications.     Review of Systems  Anesthesia Hx:  No problems with previous Anesthesia                Social:  Non-Smoker       Hematology/Oncology:  Hematology Normal                                     Cardiovascular:     Hypertension                                          Pulmonary:  Pulmonary Normal                       Renal/:   renal calculi               Hepatic/GI:  Hepatic/GI Normal                    Musculoskeletal:     Fibromyalgia            Neurological:  Neurology Normal                                      Endocrine:  Endocrine Normal                Physical Exam  General: Well nourished    Airway:  Mallampati: II   Mouth Opening: Normal  TM Distance: Normal  Neck ROM: Normal ROM    Dental:  Intact        Anesthesia Plan  Type of Anesthesia, risks & benefits discussed:    Anesthesia Type: Gen ETT, Gen Supraglottic Airway  Intra-op Monitoring Plan: Standard ASA Monitors  Post Op Pain Control Plan: multimodal analgesia  Induction:  IV  Airway Plan: , Post-Induction  Informed Consent: Informed consent signed with the Patient and all parties understand the risks and agree with anesthesia plan.  All questions answered.   ASA Score: 2    Ready For Surgery From Anesthesia Perspective.     .

## 2025-02-13 NOTE — ANESTHESIA POSTPROCEDURE EVALUATION
Anesthesia Post Evaluation    Patient: Amanda Vega    Procedure(s) Performed: Procedure(s) (LRB):  LITHOTRIPSY, ESWL (Right)    Final Anesthesia Type: general      Patient location during evaluation: PACU  Patient participation: Yes- Able to Participate  Level of consciousness: awake and alert, oriented and awake  Post-procedure vital signs: reviewed and stable  Pain management: adequate  Airway patency: patent    PONV status at discharge: No PONV  Anesthetic complications: no      Cardiovascular status: blood pressure returned to baseline  Respiratory status: unassisted  Hydration status: euvolemic  Follow-up not needed.              Vitals Value Taken Time   /65 02/13/25 1015   Temp 37.3 °C (99.1 °F) 02/13/25 0935   Pulse 89 02/13/25 1018   Resp 51 02/13/25 1018   SpO2 97 % 02/13/25 1017   Vitals shown include unfiled device data.      Event Time   Out of Recovery 10:20:21         Pain/Greg Score: Greg Score: 10 (2/13/2025 10:22 AM)

## 2025-02-14 ENCOUNTER — PATIENT MESSAGE (OUTPATIENT)
Dept: UROLOGY | Facility: CLINIC | Age: 55
End: 2025-02-14
Payer: MEDICARE

## 2025-02-14 ENCOUNTER — TELEPHONE (OUTPATIENT)
Dept: UROLOGY | Facility: CLINIC | Age: 55
End: 2025-02-14
Payer: MEDICARE

## 2025-02-14 NOTE — TELEPHONE ENCOUNTER
.Outgoing call attempted to notify patient regarding below but no answer, left voice message with contact information letting patient know she can contact us at her earliest convenience and a [employer] message sent as well.    ----- Message from Marian Pepper MD sent at 2/13/2025  9:37 AM CST -----  Please schedule 2 week follow up with KUB before the visit.

## (undated) DEVICE — HEADREST ROUND DISP FOAM 9IN

## (undated) DEVICE — PAD OR EGGCRATE BLUE 2X20X72